# Patient Record
Sex: MALE | Race: WHITE | Employment: STUDENT | URBAN - METROPOLITAN AREA
[De-identification: names, ages, dates, MRNs, and addresses within clinical notes are randomized per-mention and may not be internally consistent; named-entity substitution may affect disease eponyms.]

---
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
## 2023-12-30 PROCEDURE — 99285 EMERGENCY DEPT VISIT HI MDM: CPT

## 2023-12-31 ENCOUNTER — APPOINTMENT (OUTPATIENT)
Dept: GENERAL RADIOLOGY | Age: 18
End: 2023-12-31
Payer: COMMERCIAL

## 2023-12-31 ENCOUNTER — HOSPITAL ENCOUNTER (INPATIENT)
Age: 18
LOS: 6 days | Discharge: HOME | End: 2024-01-06
Attending: STUDENT IN AN ORGANIZED HEALTH CARE EDUCATION/TRAINING PROGRAM | Admitting: PSYCHIATRY & NEUROLOGY
Payer: COMMERCIAL

## 2023-12-31 DIAGNOSIS — F29 PSYCHOSIS, UNSPECIFIED PSYCHOSIS TYPE (HCC): Primary | ICD-10-CM

## 2023-12-31 PROBLEM — F32.A DEPRESSION WITH SUICIDAL IDEATION: Status: ACTIVE | Noted: 2023-12-31

## 2023-12-31 PROBLEM — R45.851 DEPRESSION WITH SUICIDAL IDEATION: Status: ACTIVE | Noted: 2023-12-31

## 2023-12-31 LAB
ALBUMIN SERPL-MCNC: 4.7 G/DL (ref 3.5–5.2)
ALP SERPL-CCNC: 80 U/L (ref 40–129)
ALT SERPL-CCNC: 19 U/L (ref 5–41)
AMPHET UR QL SCN: NEGATIVE
ANION GAP SERPL CALCULATED.3IONS-SCNC: 13 MMOL/L (ref 9–17)
AST SERPL-CCNC: 17 U/L
BARBITURATES UR QL SCN: NEGATIVE
BASOPHILS # BLD: 0.1 K/UL (ref 0–0.2)
BASOPHILS NFR BLD: 1 % (ref 0–2)
BENZODIAZ UR QL: NEGATIVE
BILIRUB SERPL-MCNC: 0.4 MG/DL (ref 0.3–1.2)
BILIRUB UR QL STRIP: NEGATIVE
BUN SERPL-MCNC: 21 MG/DL (ref 6–20)
CALCIUM SERPL-MCNC: 9.1 MG/DL (ref 8.6–10.4)
CANNABINOIDS UR QL SCN: POSITIVE
CHLORIDE SERPL-SCNC: 102 MMOL/L (ref 98–107)
CLARITY UR: CLEAR
CO2 SERPL-SCNC: 24 MMOL/L (ref 20–31)
COCAINE UR QL SCN: NEGATIVE
COLOR UR: YELLOW
COMMENT: ABNORMAL
CREAT SERPL-MCNC: 0.8 MG/DL (ref 0.7–1.2)
EOSINOPHIL # BLD: 0.2 K/UL (ref 0–0.4)
EOSINOPHILS RELATIVE PERCENT: 3 % (ref 0–4)
ERYTHROCYTE [DISTWIDTH] IN BLOOD BY AUTOMATED COUNT: 12.9 % (ref 11.5–14.9)
ETHANOL PERCENT: <0.01 %
ETHANOLAMINE SERPL-MCNC: <10 MG/DL
FENTANYL UR QL: NEGATIVE
GFR SERPL CREATININE-BSD FRML MDRD: >60 ML/MIN/1.73M2
GLUCOSE SERPL-MCNC: 106 MG/DL (ref 70–99)
GLUCOSE UR STRIP-MCNC: NEGATIVE MG/DL
HCT VFR BLD AUTO: 49.2 % (ref 41–53)
HGB BLD-MCNC: 16.5 G/DL (ref 13.5–17.5)
HGB UR QL STRIP.AUTO: NEGATIVE
KETONES UR STRIP-MCNC: ABNORMAL MG/DL
LEUKOCYTE ESTERASE UR QL STRIP: NEGATIVE
LYMPHOCYTES NFR BLD: 2 K/UL (ref 1.2–5.2)
LYMPHOCYTES RELATIVE PERCENT: 23 % (ref 25–45)
MAGNESIUM SERPL-MCNC: 2 MG/DL (ref 1.6–2.6)
MCH RBC QN AUTO: 30 PG (ref 26–34)
MCHC RBC AUTO-ENTMCNC: 33.6 G/DL (ref 31–37)
MCV RBC AUTO: 89.3 FL (ref 80–100)
METHADONE UR QL: NEGATIVE
MONOCYTES NFR BLD: 0.6 K/UL (ref 0.1–1.3)
MONOCYTES NFR BLD: 7 % (ref 2–8)
NEUTROPHILS NFR BLD: 66 % (ref 34–64)
NEUTS SEG NFR BLD: 5.8 K/UL (ref 1.3–9.1)
NITRITE UR QL STRIP: NEGATIVE
OPIATES UR QL SCN: NEGATIVE
OXYCODONE UR QL SCN: NEGATIVE
PCP UR QL SCN: NEGATIVE
PH UR STRIP: 7 [PH] (ref 5–8)
PLATELET # BLD AUTO: 146 K/UL (ref 150–450)
PMV BLD AUTO: 8.4 FL (ref 6–12)
POTASSIUM SERPL-SCNC: 3.7 MMOL/L (ref 3.7–5.3)
PROT SERPL-MCNC: 6.7 G/DL (ref 6.4–8.3)
PROT UR STRIP-MCNC: NEGATIVE MG/DL
RBC # BLD AUTO: 5.51 M/UL (ref 4.5–5.9)
SODIUM SERPL-SCNC: 139 MMOL/L (ref 135–144)
SP GR UR STRIP: 1.03 (ref 1–1.03)
TEST INFORMATION: ABNORMAL
TROPONIN I SERPL HS-MCNC: 7 NG/L (ref 0–22)
UROBILINOGEN UR STRIP-ACNC: NORMAL EU/DL (ref 0–1)
WBC OTHER # BLD: 8.8 K/UL (ref 4.5–13.5)

## 2023-12-31 PROCEDURE — 81003 URINALYSIS AUTO W/O SCOPE: CPT

## 2023-12-31 PROCEDURE — 99223 1ST HOSP IP/OBS HIGH 75: CPT | Performed by: NURSE PRACTITIONER

## 2023-12-31 PROCEDURE — 85025 COMPLETE CBC W/AUTO DIFF WBC: CPT

## 2023-12-31 PROCEDURE — 6370000000 HC RX 637 (ALT 250 FOR IP): Performed by: PSYCHIATRY & NEUROLOGY

## 2023-12-31 PROCEDURE — 84443 ASSAY THYROID STIM HORMONE: CPT

## 2023-12-31 PROCEDURE — 83735 ASSAY OF MAGNESIUM: CPT

## 2023-12-31 PROCEDURE — 80053 COMPREHEN METABOLIC PANEL: CPT

## 2023-12-31 PROCEDURE — 36415 COLL VENOUS BLD VENIPUNCTURE: CPT

## 2023-12-31 PROCEDURE — 84484 ASSAY OF TROPONIN QUANT: CPT

## 2023-12-31 PROCEDURE — 93005 ELECTROCARDIOGRAM TRACING: CPT | Performed by: STUDENT IN AN ORGANIZED HEALTH CARE EDUCATION/TRAINING PROGRAM

## 2023-12-31 PROCEDURE — 93005 ELECTROCARDIOGRAM TRACING: CPT

## 2023-12-31 PROCEDURE — G0480 DRUG TEST DEF 1-7 CLASSES: HCPCS

## 2023-12-31 PROCEDURE — 71045 X-RAY EXAM CHEST 1 VIEW: CPT

## 2023-12-31 PROCEDURE — 2040000000 HC PSYCH ICU R&B

## 2023-12-31 PROCEDURE — 6370000000 HC RX 637 (ALT 250 FOR IP): Performed by: NURSE PRACTITIONER

## 2023-12-31 PROCEDURE — 80307 DRUG TEST PRSMV CHEM ANLYZR: CPT

## 2023-12-31 RX ORDER — HALOPERIDOL 5 MG/1
5 TABLET ORAL EVERY 4 HOURS PRN
Status: DISCONTINUED | OUTPATIENT
Start: 2023-12-31 | End: 2024-01-06 | Stop reason: HOSPADM

## 2023-12-31 RX ORDER — HYDROXYZINE 50 MG/1
50 TABLET, FILM COATED ORAL 3 TIMES DAILY PRN
Status: DISCONTINUED | OUTPATIENT
Start: 2023-12-31 | End: 2024-01-06 | Stop reason: HOSPADM

## 2023-12-31 RX ORDER — TRAZODONE HYDROCHLORIDE 50 MG/1
50 TABLET ORAL NIGHTLY PRN
Status: DISCONTINUED | OUTPATIENT
Start: 2023-12-31 | End: 2024-01-06 | Stop reason: HOSPADM

## 2023-12-31 RX ORDER — POLYETHYLENE GLYCOL 3350 17 G/17G
17 POWDER, FOR SOLUTION ORAL DAILY PRN
Status: DISCONTINUED | OUTPATIENT
Start: 2023-12-31 | End: 2024-01-06 | Stop reason: HOSPADM

## 2023-12-31 RX ORDER — IBUPROFEN 400 MG/1
400 TABLET ORAL EVERY 6 HOURS PRN
Status: DISCONTINUED | OUTPATIENT
Start: 2023-12-31 | End: 2024-01-06 | Stop reason: HOSPADM

## 2023-12-31 RX ORDER — ACETAMINOPHEN 325 MG/1
650 TABLET ORAL EVERY 4 HOURS PRN
Status: DISCONTINUED | OUTPATIENT
Start: 2023-12-31 | End: 2024-01-06 | Stop reason: HOSPADM

## 2023-12-31 RX ORDER — MAGNESIUM HYDROXIDE/ALUMINUM HYDROXICE/SIMETHICONE 120; 1200; 1200 MG/30ML; MG/30ML; MG/30ML
30 SUSPENSION ORAL EVERY 6 HOURS PRN
Status: DISCONTINUED | OUTPATIENT
Start: 2023-12-31 | End: 2024-01-06 | Stop reason: HOSPADM

## 2023-12-31 RX ORDER — LORAZEPAM 2 MG/ML
2 INJECTION INTRAMUSCULAR EVERY 4 HOURS PRN
Status: DISCONTINUED | OUTPATIENT
Start: 2023-12-31 | End: 2024-01-06 | Stop reason: HOSPADM

## 2023-12-31 RX ORDER — LORAZEPAM 1 MG/1
2 TABLET ORAL EVERY 4 HOURS PRN
Status: DISCONTINUED | OUTPATIENT
Start: 2023-12-31 | End: 2024-01-06 | Stop reason: HOSPADM

## 2023-12-31 RX ORDER — RISPERIDONE 1 MG/1
1 TABLET ORAL 2 TIMES DAILY
Status: DISCONTINUED | OUTPATIENT
Start: 2023-12-31 | End: 2024-01-01

## 2023-12-31 RX ORDER — DIVALPROEX SODIUM 500 MG/1
500 TABLET, EXTENDED RELEASE ORAL
Status: DISCONTINUED | OUTPATIENT
Start: 2023-12-31 | End: 2024-01-06 | Stop reason: HOSPADM

## 2023-12-31 RX ORDER — HALOPERIDOL 5 MG/ML
5 INJECTION INTRAMUSCULAR EVERY 4 HOURS PRN
Status: DISCONTINUED | OUTPATIENT
Start: 2023-12-31 | End: 2024-01-06 | Stop reason: HOSPADM

## 2023-12-31 RX ORDER — POLYETHYLENE GLYCOL 3350 17 G
2 POWDER IN PACKET (EA) ORAL
Status: DISCONTINUED | OUTPATIENT
Start: 2023-12-31 | End: 2024-01-06 | Stop reason: HOSPADM

## 2023-12-31 RX ORDER — DIPHENHYDRAMINE HYDROCHLORIDE 50 MG/ML
50 INJECTION INTRAMUSCULAR; INTRAVENOUS EVERY 4 HOURS PRN
Status: DISCONTINUED | OUTPATIENT
Start: 2023-12-31 | End: 2024-01-06 | Stop reason: HOSPADM

## 2023-12-31 RX ADMIN — LORAZEPAM 2 MG: 1 TABLET ORAL at 09:18

## 2023-12-31 RX ADMIN — HYDROXYZINE HYDROCHLORIDE 50 MG: 50 TABLET, FILM COATED ORAL at 20:41

## 2023-12-31 RX ADMIN — TRAZODONE HYDROCHLORIDE 50 MG: 50 TABLET ORAL at 20:41

## 2023-12-31 RX ADMIN — HALOPERIDOL 5 MG: 5 TABLET ORAL at 09:17

## 2023-12-31 RX ADMIN — NICOTINE POLACRILEX 2 MG: 2 LOZENGE ORAL at 09:01

## 2023-12-31 RX ADMIN — HYDROXYZINE HYDROCHLORIDE 50 MG: 50 TABLET, FILM COATED ORAL at 02:29

## 2023-12-31 RX ADMIN — RISPERIDONE 1 MG: 1 TABLET ORAL at 16:04

## 2023-12-31 RX ADMIN — RISPERIDONE 1 MG: 1 TABLET ORAL at 20:41

## 2023-12-31 RX ADMIN — HYDROXYZINE HYDROCHLORIDE 50 MG: 50 TABLET, FILM COATED ORAL at 09:01

## 2023-12-31 RX ADMIN — DIVALPROEX SODIUM 500 MG: 500 TABLET, EXTENDED RELEASE ORAL at 16:04

## 2023-12-31 RX ADMIN — NICOTINE POLACRILEX 2 MG: 2 LOZENGE ORAL at 16:14

## 2023-12-31 RX ADMIN — TRAZODONE HYDROCHLORIDE 50 MG: 50 TABLET ORAL at 02:29

## 2023-12-31 ASSESSMENT — SLEEP AND FATIGUE QUESTIONNAIRES
AVERAGE NUMBER OF SLEEP HOURS: 3
DO YOU USE A SLEEP AID: NO
SLEEP PATTERN: DIFFICULTY FALLING ASLEEP;DISTURBED/INTERRUPTED SLEEP;RESTLESSNESS
DO YOU HAVE DIFFICULTY SLEEPING: YES

## 2023-12-31 ASSESSMENT — PATIENT HEALTH QUESTIONNAIRE - PHQ9
8. MOVING OR SPEAKING SO SLOWLY THAT OTHER PEOPLE COULD HAVE NOTICED. OR THE OPPOSITE, BEING SO FIGETY OR RESTLESS THAT YOU HAVE BEEN MOVING AROUND A LOT MORE THAN USUAL: 0
10. IF YOU CHECKED OFF ANY PROBLEMS, HOW DIFFICULT HAVE THESE PROBLEMS MADE IT FOR YOU TO DO YOUR WORK, TAKE CARE OF THINGS AT HOME, OR GET ALONG WITH OTHER PEOPLE: 2
5. POOR APPETITE OR OVEREATING: 1
SUM OF ALL RESPONSES TO PHQ QUESTIONS 1-9: 11
SUM OF ALL RESPONSES TO PHQ QUESTIONS 1-9: 13
9. THOUGHTS THAT YOU WOULD BE BETTER OFF DEAD, OR OF HURTING YOURSELF: 2
SUM OF ALL RESPONSES TO PHQ QUESTIONS 1-9: 13
3. TROUBLE FALLING OR STAYING ASLEEP: 2
SUM OF ALL RESPONSES TO PHQ9 QUESTIONS 1 & 2: 4
6. FEELING BAD ABOUT YOURSELF - OR THAT YOU ARE A FAILURE OR HAVE LET YOURSELF OR YOUR FAMILY DOWN: 2
SUM OF ALL RESPONSES TO PHQ QUESTIONS 1-9: 13
1. LITTLE INTEREST OR PLEASURE IN DOING THINGS: 2
7. TROUBLE CONCENTRATING ON THINGS, SUCH AS READING THE NEWSPAPER OR WATCHING TELEVISION: 0
4. FEELING TIRED OR HAVING LITTLE ENERGY: 2
2. FEELING DOWN, DEPRESSED OR HOPELESS: 2

## 2023-12-31 ASSESSMENT — PAIN - FUNCTIONAL ASSESSMENT: PAIN_FUNCTIONAL_ASSESSMENT: 0-10

## 2023-12-31 ASSESSMENT — PAIN SCALES - GENERAL
PAINLEVEL_OUTOF10: 0
PAINLEVEL_OUTOF10: 0

## 2023-12-31 ASSESSMENT — LIFESTYLE VARIABLES
HOW OFTEN DO YOU HAVE A DRINK CONTAINING ALCOHOL: MONTHLY OR LESS
HOW MANY STANDARD DRINKS CONTAINING ALCOHOL DO YOU HAVE ON A TYPICAL DAY: 1 OR 2
HOW OFTEN DO YOU HAVE A DRINK CONTAINING ALCOHOL: MONTHLY OR LESS
HOW MANY STANDARD DRINKS CONTAINING ALCOHOL DO YOU HAVE ON A TYPICAL DAY: 1 OR 2

## 2023-12-31 NOTE — PROGRESS NOTES
Pt transferred to PICU X 2 staff. Report given to staff and valuables placed in belongings room. Pt behavior controlled while transferred.

## 2023-12-31 NOTE — ED NOTES
Mode of arrival (squad #, walk in, police, etc) : Walk-in        Chief complaint(s): Anxiety, generalized body aches, tachycardia, hallucinations, chest pain        Arrival Note (brief scenario, treatment PTA, etc).: Pt states that his parents \"forced him to come to the ED\" but asking further pt states he wants to be seen. Pt states that he has anxiety, \"heart beating fast\", shaking, chest pain, and generalized body aches. Pt states that he has \"deep vibes\" that makes him want to get away. Pt states that he denies SI/HI but is hearing voices that tells him to \"get away\". Pt states that his parents and girlfriends are hiding his meds and not sharing information. Pt denies drugs or alcohol.         C= \"Have you ever felt that you should Cut down on your drinking?\"  No  A= \"Have people Annoyed you by criticizing your drinking?\"  No  G= \"Have you ever felt bad or Guilty about your drinking?\"  No  E= \"Have you ever had a drink as an Eye-opener first thing in the morning to steady your nerves or to help a hangover?\"  No      Deferred []      Reason for deferring: N/A    *If yes to two or more: probable alcohol abuse.*

## 2023-12-31 NOTE — PROGRESS NOTES
Behavioral Services  Medicare Certification Upon Admission    I certify that this patient's inpatient psychiatric hospital admission is medically necessary for:    [x] (1) Treatment which could reasonably be expected to improve this patient's condition,       [x] (2) Or for diagnostic study;     AND     [x](2) The inpatient psychiatric services are provided while the individual is under the care of a physician and are included in the individualized plan of care.    Estimated length of stay/service 4 to 7 days    Plan for post-hospital care home with outpatient community mental health follow-up    Electronically signed by CARMEL COBIAN MD on 12/31/2023 at 10:09 AM
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
## 2023-12-31 NOTE — PROGRESS NOTES
RT ASSESSMENT TREATMENT GOALS    []Pt Goal: Pt will identify 1-2 positive coping skills by time of discharge.    []Pt Goal: Pt will identify 1-2 positive aspects of self by time of discharge.    [x]Pt Goal: Pt will remain on task/topic for 15-30 minutes during group by time of discharge.    []Pt Goal: Pt will identify 1-2 aspects of relapse prevention plan by time of discharge.    [x]Pt Goal: Pt will join in conversation with peers 1-2 times per group by time of discharge.    []Pt Goal: Pt will identify 1-2 new leisure interests by time of discharge.    [x]Pt Goal: Pt will not voice any delusional content by time of discharge.

## 2023-12-31 NOTE — PLAN OF CARE
Problem: Depression/Self Harm  Goal: Effect of psychiatric condition will be minimized and patient will be protected from self harm  Description: INTERVENTIONS:  1. Assess impact of patient's symptoms on level of functioning, self care needs and offer support as indicated  2. Assess patient/family knowledge of depression, impact on illness and need for teaching  3. Provide emotional support, presence and reassurance  4. Assess for possible suicidal thoughts or ideation. If patient expresses suicidal thoughts or statements do not leave alone, initiate Suicide Precautions, move to a room close to the nursing station and obtain sitter  5. Initiate consults as appropriate with Mental Health Professional, Spiritual Care, Psychosocial CNS, and consider a recommendation to the LIP for a Psychiatric Consultation  Outcome: Progressing   1:1 with pt x ten minutes.  Pt encouraged to attend unit programming and interact with peers and staff.  Pt also encouraged to tend to hygiene and ADLs.  Pt encouraged to discuss feelings with staff and feedback and reassurance provided.    Pt is agitated as evidence by pacing, fidgeting, and rambling speech. Staff attempted to find and relieve the distress by  talking to pt, refocusing on new activity, offering suggestions, checking for undiagnosed pain, administer PRN medications. Pt is currently in behavioral control and accepted PRN medications. Pt did sleep after prn medications. Pt is paranoid et delusional that family is out to kill him.

## 2023-12-31 NOTE — CARE COORDINATION
BHI Biopsychosocial Assessment    Current Level of Psychosocial Functioning     Independent   Dependent  X  Minimal Assist       Psychosocial High Risk Factors (check all that apply)    Unable to obtain meds   Chronic illness/pain    Substance abuse X Marijuana   Lack of Family Support   Financial stress   Isolation X  Inadequate Community Resources   Suicide attempt(s)   Not taking medications   Victim of crime   Developmental Delay  Unable to manage personal needs  X  Age 65 or older   Homeless  No transportation   Readmission within 30 days X  Unemployment  Traumatic Event    Psychiatric Advanced Directives: none reported     Family to Involve in Treatment: Reports mother and father are supportive and involved in his care     Sexual Orientation: LAUREL    Patient Strengths: family support, linked with [facility] for outpatient Treatment, insurance     Patient Barriers: off medications, presenting with hallucinations and Acute Psychosis, Recent inpatient hospitalization at [facility] from 12/16/12/22/2023.     Opiate Education Provided: N/A Patient denies and does not have any documented history of Opiate or Heroin use/abuse. Patient reports Marijuana use and his drug screen upon admission was positive for Cannabis.     CMHC/mental health history: linked with [facility], was staying woth parents in [place], now stating that he no longer wants to live with them and wants to go to live with his Uncle in [place], [facility] hospitalization was from 12/16-12/22/2023.     Plan of Care   medication management, group/individual therapies, family meetings, psycho -education, treatment team meetings to assist with stabilization    Initial Discharge Plan:  Patient reports that he wants to stay with his uncle in [place] at discharge, plans are undetermined at this time. Patient will be scheduled an outpatient follow up appointment at discharge.     Clinical Summary:  Car Coyle is a 18 year old male who presents on admission with

## 2023-12-31 NOTE — ED PROVIDER NOTES
EMERGENCY DEPARTMENT ENCOUNTER    Pt Name: Car Coyle  MRN: [id number]  Birthdate 2005  Date of evaluation: 12/31/23  CHIEF COMPLAINT       Chief Complaint   Patient presents with    Anxiety    Generalized Body Aches    Tachycardia    Hallucinations    Chest Pain     HISTORY OF PRESENT ILLNESS   18-year-old presenting with generalized complaints    Has a history of previous psychiatric admitted for psychosis recently    States he is not feeling well he is feeling anxious he is feeling shaking all over his body he is feeling palpitations and chest discomfort he feels nervous    He denies any suicidal homicidal ideation    States he has not been taking his medication              REVIEW OF SYSTEMS     Review of Systems   Constitutional:  Negative for chills and fever.   HENT:  Negative for rhinorrhea and sore throat.    Eyes:  Negative for discharge and redness.   Respiratory:  Negative for chest tightness and shortness of breath.    Cardiovascular:  Positive for chest pain and palpitations.   Gastrointestinal:  Negative for abdominal pain, diarrhea, nausea and vomiting.   Genitourinary:  Negative for dysuria and frequency.   Musculoskeletal:  Positive for myalgias. Negative for arthralgias.   Skin:  Negative for rash.   Neurological:  Negative for weakness and numbness.   Psychiatric/Behavioral:  Negative for suicidal ideas. The patient is nervous/anxious.    All other systems reviewed and are negative.    PASTMEDICAL HISTORY   History reviewed. No pertinent past medical history.  Past Problem List  Patient Active Problem List   Diagnosis Code    Acute psychosis ([facility]) F23     SURGICAL HISTORY       Past Surgical History:   Procedure Laterality Date    ADENOIDECTOMY      TONSILLECTOMY       CURRENT MEDICATIONS       Previous Medications    DIVALPROEX (DEPAKOTE ER) 500 MG EXTENDED RELEASE TABLET    Take 1 tablet by mouth 2 times daily (before meals)    HYDROXYZINE HCL (ATARAX) 50 MG TABLET    Take 1 tablet  FOLLOW UP THE PATIENT:  No follow-up provider specified.    MD Eddie Long Jeffrey, MD  12/31/23 0144

## 2023-12-31 NOTE — PROGRESS NOTES
Pt is agitated as evidence by pacing, fidgeting, and rambling speech. Staff attempted to find and relieve the distress by  talking to pt, refocusing on new activity, offering suggestions, checking for undiagnosed pain, administer PRN medications. Pt is currently in behavioral control and accepted PRN medications. Continues to pace unit.

## 2023-12-31 NOTE — GROUP NOTE
Community Meeting Group Note        Date: December 31, 2023 Start Time: 9am  End Time: 10am      Number of Participants in Group & Unit Census:  11/20    Topic: Goal group    Goal of Group:Coping skills      Comments:     Patient did not participate in Community Meeting group, despite staff encouragement and explanation of benefits.  Patient remain seclusive to self.  Q15 minute safety checks maintained for patient safety and will continue to encourage patient to attend unit programming.

## 2023-12-31 NOTE — PLAN OF CARE
[facility]  Initial Interdisciplinary Treatment Plan NO      Original treatment plan Date & Time: 12/31/2023   1300    Admission Type:  Admission Type: Voluntary    Reason for admission:   Reason for Admission: Patient acting erratic at home, impulsive, destroying his jie gifts, not sleeping. Patient hasn't been taking his medications regulary since being discharged last.    Estimated Length of Stay:  5-7days  Estimated Discharge Date: to be determined by physician    PATIENT STRENGTHS:  Patient Strengths:   Patient Strengths and Limitations:Limitations: Unrealistic self-view, Difficulty problem solving/relies on others to help solve problems, Difficult relationships / poor social skills  Addictive Behavior: Addictive Behavior  In the Past 3 Months, Have You Felt or Has Someone Told You That You Have a Problem With  : None  Medical Problems:History reviewed. No pertinent past medical history.  Status EXAM:Mental Status and Behavioral Exam  Normal: No  Level of Assistance: Independent/Self  Facial Expression: Exaggerated, Hostile  Affect: Unstable, Incongruent, Inappropriate  Level of Consciousness: Alert  Frequency of Checks: 4 times per hour, close  Mood:Normal: No  Mood: Anxious, Labile, Suspicious, Irritable  Motor Activity:Normal: No  Motor Activity: Agitated  Eye Contact: Poor  Observed Behavior: Aggressive, Compulsive, Agitated, Hostile, Impulsive, Preoccupied  Sexual Misconduct History: Current - no  Preception: [place] to person, [place] to time, [place] to place, [place] to situation  Attention:Normal: No  Attention: Distractible  Thought Processes: Flight of ideas, Loose association, Tangential  Thought Content:Normal: No  Thought Content: Paranoia, Preoccupations  Depression Symptoms: Impaired concentration  Anxiety Symptoms: Generalized  Jacquie Symptoms: Flight of ideas, Labile, Pressured speech, Rapid cycling  Hallucinations: None  Delusions: Yes  Delusions: Paranoid  Memory:Normal:

## 2023-12-31 NOTE — GROUP NOTE
Group Therapy Note    Date: 12/31/2023    Group Start Time: 1330  Group End Time: 1415  Group Topic: Cognitive Skills    Kimberly Costa CTRS        Group Therapy Note    Attendees: 8/19     Topic: To increase social interaction, decision making, and communication skills.        Patient did not participate in Cognitive Skills Group at 13:30, despite staff encouragement and explanation of   benefits. Patient remains somnolent, seclusive to self and room.       Q15 minute safety checks maintained for patient safety and will continue to encourage patient to attend unit   programming.         Discipline Responsible: Psychoeducational Specialist    Signature:  THU AGRAWAL
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
## 2023-12-31 NOTE — BH NOTE
[facility] [place]  Admission Note     Admission Type:   Admission Type: Voluntary    Reason for admission:  Patient acting erratic at home, impulsive, destroying his jie gifts, not sleeping. Patient hasn't been taking his medications regulary since being discharged last.     Addictive Behavior:   Addictive Behavior  In the Past 3 Months, Have You Felt or Has Someone Told You That You Have a Problem With  : None    Medical Problems:   History reviewed. No pertinent past medical history.    Status EXAM:  Mental Status and Behavioral Exam  Normal: No  Level of Assistance: Independent/Self  Facial Expression: Flat, Worried  Affect: Constricted  Level of Consciousness: Alert  Frequency of Checks: 4 times per hour, close  Mood:Normal: No  Mood: Anxious  Motor Activity:Normal: No  Motor Activity: Other (comment) (pt shaking)  Eye Contact: Poor  Observed Behavior: Cooperative, Preoccupied  Sexual Misconduct History: Current - no  Preception: [place] to person, [place] to time, [place] to place, [place] to situation  Attention:Normal: No  Attention: Unable to concentrate  Thought Processes: Unremarkable  Thought Content:Normal: No  Thought Content: Preoccupations, Paranoia  Depression Symptoms: Sleep disturbance  Anxiety Symptoms: Chest pain, Generalized  Jacquie Symptoms: Less need to sleep, Poor judgment  Hallucinations: None  Delusions: Yes  Delusions: Paranoid  Memory:Normal: No  Memory: Poor recent  Insight and Judgment: No  Insight and Judgment: Poor judgment, Poor insight    Tobacco Screening:  Practical Counseling, on admission, dinora X, if applicable and completed (first 3 are required if patient doesn't refuse):            ( ) Recognizing danger situations (included triggers and roadblocks)                    ( ) Coping skills (new ways to manage stress,relaxation techniques, changing routine, distraction)                                                           ( ) Basic information about quitting  (benefits of quitting, techniques in how to quit, available resources  ( ) [facility]                                                                                                                   ( x) Patient refused counseling  ( ) Patient has not smoked in the last 30 days    Metabolic Screening:    Lab Results   Component Value Date    LABA1C 4.7 12/19/2023       Lab Results   Component Value Date    CHOL 114 12/19/2023     Lab Results   Component Value Date    TRIG 55 12/19/2023     Lab Results   Component Value Date    HDL 61 12/19/2023     No components found for: \"LDLCAL\"  No results found for: \"LABVLDL\"      Body mass index is 22.66 kg/m².    BP Readings from Last 2 Encounters:   12/31/23 129/62   12/22/23 137/86         Pt admitted with followings belongings:  Dental Appliances: Other (Comment) (PATIENT BELONGINGS DOUBLE BAGGED DUE TO POSSIBLE BED BUGS)  Vision - Corrective Lenses: Other (Comment)  Hearing Aid: Other (Comment)  Jewelry: Other (Comment)  Body Piercings Removed: N/A  Clothing: Other (Comment)  Other Valuables: Other (Comment)  The following personal items were collected during admission. Items secured in locker/safe. Items will be returned to patient at discharge.       Patient wanded for safety and oriented to unit.   Cristela Sotelo RN

## 2023-12-31 NOTE — BH NOTE
Patient given tobacco quitline number 15425181807 at this time, refusing to call at this time, states \" I just dont want to quit now\"- patient given information as to the dangers of long term tobacco use. Continue to reinforce the importance of tobacco cessation.

## 2023-12-31 NOTE — H&P
IN-PATIENT SERVICE  [facility]    CONSULTATION / HISTORY AND PHYSICAL EXAMINATION            Date:   12/31/2023  Patient name:  Car Coyle  Date of admission:  12/31/2023 12:24 AM  MRN:   [id number]  Account:  [id number]  YOB: 2005  PCP:    Uvaldo Shaw MD  Room:   [address] [place] [address]  Code Status:    Full Code    Physician Requesting Consult: Luis A Gonzalez MD    Reason for Consult:  medical management    Chief Complaint:     Chief Complaint   Patient presents with    Anxiety    Generalized Body Aches    Tachycardia    Hallucinations    Chest Pain       History Obtained From:     Patient medical record nursing staff    History of Present Illness:     Patient is 18-year-old male with past medical history of mental illness, he has been admitted at [facility] for further management of acute psychosis  Patient had similar  admissions, found to be hypertensive which was likely secondary to her agitation, not on any medications, blood pressures currently stay    Past Medical History:     History reviewed. No pertinent past medical history.     Past Surgical History:     Past Surgical History:   Procedure Laterality Date    ADENOIDECTOMY      TONSILLECTOMY          Medications Prior to Admission:     Prior to Admission medications    Medication Sig Start Date End Date Taking? Authorizing Provider   divalproex (DEPAKOTE ER) 500 MG extended release tablet Take 1 tablet by mouth 2 times daily (before meals) 12/22/23   Liam Hannon MD   traZODone (DESYREL) 50 MG tablet Take 1 tablet by mouth nightly as needed for Sleep 12/22/23   Liam Hannon MD   risperiDONE (RISPERDAL) 1 MG tablet Take 1 tablet by mouth 2 times daily 12/22/23   Liam Hannon MD   hydrOXYzine HCl (ATARAX) 50 MG tablet Take 1 tablet by mouth 3 times daily as needed for Anxiety    ProviderRubin MD        Allergies:     Patient has no known allergies.    Social  extraocular eye movements intact, conjunctiva clear  Ear: normal external ear, no discharge, hearing intact  Nose:  no drainage noted  Mouth: mucous membranes moist  Neck: supple, no carotid bruits, thyroid not palpable  Lungs: Bilateral equal air entry, clear to ausculation, no wheezing, rales or rhonchi, normal effort  Cardiovascular: normal rate, regular rhythm, no murmur, gallop, rub.  Abdomen: Soft, nontender, nondistended, normal bowel sounds, no hepatomegaly or splenomegaly  Neurologic: There are no new focal motor or sensory deficits, normal muscle tone and bulk, no abnormal sensation, normal speech, cranial nerves II through XII grossly intact  Skin: No gross lesions, rashes, bruising or bleeding on exposed skin area  Extremities:  peripheral pulses palpable, no pedal edema or calf pain with palpation  Psych:     Investigations:      Laboratory Testing:  Recent Results (from the past 24 hour(s))   Urinalysis with Reflex to Culture    Collection Time: 12/31/23  1:00 AM    Specimen: Urine   Result Value Ref Range    Color, UA Yellow Yellow    Turbidity UA Clear Clear    Glucose, Ur NEGATIVE NEGATIVE mg/dL    Bilirubin Urine NEGATIVE NEGATIVE    Ketones, Urine SMALL (A) NEGATIVE mg/dL    Specific Gravity, UA 1.026 1.000 - 1.030    Urine Hgb NEGATIVE NEGATIVE    pH, UA 7.0 5.0 - 8.0    Protein, UA NEGATIVE NEGATIVE mg/dL    Urobilinogen, Urine Normal 0.0 - 1.0 EU/dL    Nitrite, Urine NEGATIVE NEGATIVE    Leukocyte Esterase, Urine NEGATIVE NEGATIVE    Comment       Microscopic exam not performed based on chemical results unless requested in original order.   Urine Drug Screen    Collection Time: 12/31/23  1:00 AM   Result Value Ref Range    Amphetamine Screen, Ur NEGATIVE NEGATIVE    Barbiturate Screen, Ur NEGATIVE NEGATIVE    Benzodiazepine Screen, Urine NEGATIVE NEGATIVE    Cocaine Metabolite, Urine NEGATIVE NEGATIVE    Methadone Screen, Urine NEGATIVE NEGATIVE    Opiates, Urine NEGATIVE NEGATIVE

## 2024-01-01 LAB — TSH SERPL DL<=0.05 MIU/L-ACNC: 1.97 UIU/ML (ref 0.3–5)

## 2024-01-01 PROCEDURE — 6370000000 HC RX 637 (ALT 250 FOR IP): Performed by: PSYCHIATRY & NEUROLOGY

## 2024-01-01 PROCEDURE — 6370000000 HC RX 637 (ALT 250 FOR IP): Performed by: NURSE PRACTITIONER

## 2024-01-01 PROCEDURE — APPSS30 APP SPLIT SHARED TIME 16-30 MINUTES

## 2024-01-01 PROCEDURE — 2040000000 HC PSYCH ICU R&B

## 2024-01-01 PROCEDURE — 99232 SBSQ HOSP IP/OBS MODERATE 35: CPT | Performed by: PSYCHIATRY & NEUROLOGY

## 2024-01-01 RX ORDER — RISPERIDONE 3 MG/1
1.5 TABLET ORAL 2 TIMES DAILY
Status: DISCONTINUED | OUTPATIENT
Start: 2024-01-01 | End: 2024-01-03

## 2024-01-01 RX ADMIN — NICOTINE POLACRILEX 2 MG: 2 LOZENGE ORAL at 10:22

## 2024-01-01 RX ADMIN — HALOPERIDOL 5 MG: 5 TABLET ORAL at 17:14

## 2024-01-01 RX ADMIN — NICOTINE POLACRILEX 2 MG: 2 LOZENGE ORAL at 12:15

## 2024-01-01 RX ADMIN — NICOTINE POLACRILEX 2 MG: 2 LOZENGE ORAL at 14:47

## 2024-01-01 RX ADMIN — DIVALPROEX SODIUM 500 MG: 500 TABLET, EXTENDED RELEASE ORAL at 06:15

## 2024-01-01 RX ADMIN — LORAZEPAM 2 MG: 1 TABLET ORAL at 17:14

## 2024-01-01 RX ADMIN — HYDROXYZINE HYDROCHLORIDE 50 MG: 50 TABLET, FILM COATED ORAL at 16:06

## 2024-01-01 RX ADMIN — NICOTINE POLACRILEX 2 MG: 2 LOZENGE ORAL at 16:06

## 2024-01-01 RX ADMIN — DIVALPROEX SODIUM 500 MG: 500 TABLET, EXTENDED RELEASE ORAL at 16:06

## 2024-01-01 RX ADMIN — RISPERIDONE 1 MG: 1 TABLET ORAL at 07:46

## 2024-01-01 RX ADMIN — HYDROXYZINE HYDROCHLORIDE 50 MG: 50 TABLET, FILM COATED ORAL at 10:58

## 2024-01-01 RX ADMIN — NICOTINE POLACRILEX 2 MG: 2 LOZENGE ORAL at 07:38

## 2024-01-01 ASSESSMENT — PAIN SCALES - GENERAL: PAINLEVEL_OUTOF10: 0

## 2024-01-01 NOTE — BH NOTE
Emergency Medication Follow-Up Note:    PRN medication of PO Haldol 5mg Ativan 2mg was effective as evidence by Patient regain behavioral control. Patient denies medication side effects. Will continue to monitor and provide support as needed.

## 2024-01-01 NOTE — GROUP NOTE
Group Therapy Note    Date: 1/1/2024    Group Start Time: 1400  Group End Time: 1420  Group Topic: Cognitive Skills    [facility]    Josi Ruiz [facility]    Cognitive Skills Group Note        Date: January 1, 2024 Start Time: 2pm  End Time:  220pm      Number of Participants in Group & Unit Census:  2/7    Topic: cognitive skills     Goal of Group: pt will demonstrate improved coping skills and improved leisure awareness      Comments:     Patient did not participate in Cognitive Skills group, despite staff encouragement and explanation of benefits.  Patient remain seclusive to self.  Q15 minute safety checks maintained for patient safety and will continue to encourage patient to attend unit programming.              Signature:  THU MARINO

## 2024-01-01 NOTE — GROUP NOTE
Group Therapy Note    Date: 1/1/2024    Group Start Time: 0900  Group End Time: 0920  Group Topic: Community Meeting    Jessi Sellers        Group Therapy Note    Attendees: 5/7       Patient's Goal:  Talk to the doctor    Notes:  Goal setting    Status After Intervention:  Improved    Participation Level: Interactive    Participation Quality: Appropriate      Speech:  normal      Thought Process/Content: Logical      Affective Functioning: Congruent      Mood: anxious      Level of consciousness:  Alert      Response to Learning: Able to verbalize current knowledge/experience      Endings: None Reported    Modes of Intervention: Education, Support, Socialization, and Exploration      Discipline Responsible: Behavorial Health Tech      Signature:  Jessi Pandey

## 2024-01-01 NOTE — PLAN OF CARE
Problem: Pain  Goal: Verbalizes/displays adequate comfort level or baseline comfort level  Outcome: Progressing   Patient denies pain at this time.  Problem: Coping  Goal: Pt/Family able to verbalize concerns and demonstrate effective coping strategies  Description: INTERVENTIONS:  1. Assist patient/family to identify coping skills, available support systems and cultural and spiritual values  2. Provide emotional support, including active listening and acknowledgement of concerns of patient and caregivers  3. Reduce environmental stimuli, as able  4. Instruct patient/family in relaxation techniques, as appropriate  5. Assess for spiritual pain/suffering and initiate Spiritual Care, Psychosocial Clinical Specialist consults as needed  Outcome: Progressing   Patient flat, isolative, out for needs only. Patient accepting of medication administration.  Problem: Depression/Self Harm  Goal: Effect of psychiatric condition will be minimized and patient will be protected from self harm  Description: INTERVENTIONS:  1. Assess impact of patient's symptoms on level of functioning, self care needs and offer support as indicated  2. Assess patient/family knowledge of depression, impact on illness and need for teaching  3. Provide emotional support, presence and reassurance  4. Assess for possible suicidal thoughts or ideation. If patient expresses suicidal thoughts or statements do not leave alone, initiate Suicide Precautions, move to a room close to the nursing station and obtain sitter  5. Initiate consults as appropriate with Mental Health Professional, Spiritual Care, Psychosocial CNS, and consider a recommendation to the LIP for a Psychiatric Consultation  Outcome: Progressing   Patient reports minimal depression at this time. Patient encouraged to attend groups to work on coping skills.
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
## 2024-01-01 NOTE — BH NOTE
Father called and relayed information that per patient history, he does not respond well to Risperdal but rather does well on Depakote, Abilify and Hydroxyzine.

## 2024-01-01 NOTE — BH NOTE
Emergency PRN Medication Administration Note:      Patient is Agitated and Disruptive as evidence by Pacing yelling out bursts. Pt approached writer asking for something to calm down. Staff attempted to find and relieve the distress by Talking to patient, offering distractions  Patient is currently   accepted PRN medications. Medication Administered as prescribed: PO Haldol 5mg, Ativan 2mg. Patient Tolerated medication administration.   Will continue to monitor, offer support, and reassess.

## 2024-01-01 NOTE — PLAN OF CARE
Problem: Coping  Goal: Pt/Family able to verbalize concerns and demonstrate effective coping strategies  Description: INTERVENTIONS:  1. Assist patient/family to identify coping skills, available support systems and cultural and spiritual values  2. Provide emotional support, including active listening and acknowledgement of concerns of patient and caregivers  3. Reduce environmental stimuli, as able  4. Instruct patient/family in relaxation techniques, as appropriate  5. Assess for spiritual pain/suffering and initiate Spiritual Care, Psychosocial Clinical Specialist consults as needed  1/1/2024 1729 by Ana Rosa Garcia LPN  Outcome: Not Progressing  Flowsheets (Taken 1/1/2024 1729)  Patient/family able to verbalize anxieties, fears, and concerns, and demonstrate effective coping:   Assist patient/family to identify coping skills, available support systems and cultural and spiritual values   Provide emotional support, including active listening and acknowledgement of concerns of patient and caregivers   Reduce environmental stimuli, as able   Instruct patient/family in relaxation techniques, as appropriate     Problem: Depression/Self Harm  Goal: Effect of psychiatric condition will be minimized and patient will be protected from self harm  Description: INTERVENTIONS:  1. Assess impact of patient's symptoms on level of functioning, self care needs and offer support as indicated  2. Assess patient/family knowledge of depression, impact on illness and need for teaching  3. Provide emotional support, presence and reassurance  4. Assess for possible suicidal thoughts or ideation. If patient expresses suicidal thoughts or statements do not leave alone, initiate Suicide Precautions, move to a room close to the nursing station and obtain sitter  5. Initiate consults as appropriate with Mental Health Professional, Spiritual Care, Psychosocial CNS, and consider a recommendation to the LIP for a Psychiatric

## 2024-01-02 LAB
EKG ATRIAL RATE: 92 BPM
EKG P AXIS: 56 DEGREES
EKG P-R INTERVAL: 104 MS
EKG Q-T INTERVAL: 358 MS
EKG QRS DURATION: 100 MS
EKG QTC CALCULATION (BAZETT): 442 MS
EKG R AXIS: 86 DEGREES
EKG T AXIS: 36 DEGREES
EKG VENTRICULAR RATE: 92 BPM

## 2024-01-02 PROCEDURE — 6370000000 HC RX 637 (ALT 250 FOR IP): Performed by: PSYCHIATRY & NEUROLOGY

## 2024-01-02 PROCEDURE — 6370000000 HC RX 637 (ALT 250 FOR IP): Performed by: NURSE PRACTITIONER

## 2024-01-02 PROCEDURE — APPSS30 APP SPLIT SHARED TIME 16-30 MINUTES: Performed by: NURSE PRACTITIONER

## 2024-01-02 PROCEDURE — 99232 SBSQ HOSP IP/OBS MODERATE 35: CPT | Performed by: PSYCHIATRY & NEUROLOGY

## 2024-01-02 PROCEDURE — 6370000000 HC RX 637 (ALT 250 FOR IP)

## 2024-01-02 PROCEDURE — 2040000000 HC PSYCH ICU R&B

## 2024-01-02 RX ADMIN — TRAZODONE HYDROCHLORIDE 50 MG: 50 TABLET ORAL at 20:29

## 2024-01-02 RX ADMIN — NICOTINE POLACRILEX 2 MG: 2 LOZENGE ORAL at 15:19

## 2024-01-02 RX ADMIN — DIVALPROEX SODIUM 500 MG: 500 TABLET, EXTENDED RELEASE ORAL at 16:19

## 2024-01-02 RX ADMIN — HYDROXYZINE HYDROCHLORIDE 50 MG: 50 TABLET, FILM COATED ORAL at 20:29

## 2024-01-02 RX ADMIN — NICOTINE POLACRILEX 2 MG: 2 LOZENGE ORAL at 13:00

## 2024-01-02 RX ADMIN — RISPERIDONE 1.5 MG: 3 TABLET ORAL at 07:20

## 2024-01-02 RX ADMIN — DIVALPROEX SODIUM 500 MG: 500 TABLET, EXTENDED RELEASE ORAL at 06:48

## 2024-01-02 RX ADMIN — NICOTINE POLACRILEX 2 MG: 2 LOZENGE ORAL at 10:51

## 2024-01-02 RX ADMIN — RISPERIDONE 1.5 MG: 3 TABLET ORAL at 20:29

## 2024-01-02 RX ADMIN — NICOTINE POLACRILEX 2 MG: 2 LOZENGE ORAL at 16:19

## 2024-01-02 RX ADMIN — NICOTINE POLACRILEX 2 MG: 2 LOZENGE ORAL at 19:45

## 2024-01-02 RX ADMIN — NICOTINE POLACRILEX 2 MG: 2 LOZENGE ORAL at 07:20

## 2024-01-02 NOTE — GROUP NOTE
Group Therapy Note    Date: 1/2/2024    Group Start Time: 0900  Group End Time: 0920  Group Topic: Community Meeting    Jessi Sellers        Group Therapy Note    Attendees: 5/7       Patient's Goal:  \"Get out of here\"    Notes:  Goal setting    Status After Intervention:  Improved    Participation Level: Minimal    Participation Quality: Attentive      Speech:  normal, pressured, and hesitant      Thought Process/Content: Logical      Affective Functioning: Flat      Mood: anxious      Level of consciousness:  Alert, Oriented x4, and Attentive      Response to Learning: Able to verbalize current knowledge/experience      Endings: None Reported    Modes of Intervention: Education, Support, Socialization, and Exploration      Discipline Responsible: Behavorial Health Tech      Signature:  Jessi Pandey

## 2024-01-02 NOTE — PROGRESS NOTES
Daily Progress Note  1/2/2024    Patient Name: Car Coyle    CHIEF COMPLAINT: Acute psychosis         SUBJECTIVE:      Patient seen face-to-face for follow-up assessment.  He is restless and pacing in his room.  Reports that his thoughts are racing.  He does not feel that he has been sleeping well.  Patient also notes nausea, will add Zofran for this.  Patient states that he has no idea why he is admitted to the hospital.  Very angry towards his parents and states that they lied.  Would provide no details of why he believes this.  Per previous documentation, patient has been behaving bizarrely and aggressively in the community.  He destroyed all of his [place] presents and wrote the word \"rape\" all over $500 check from his grandmother, which was shown to ED staff.  Patient is dismissive and becomes frustrated and he attempts to discuss these behaviors.  Did request to speak with patient's mother or father to gain collateral information.  However, patient refuses.  He is not forthcoming with discussing any psychiatric symptoms.  He is discharge focused.  Compliant with Depakote 500 mg twice daily and was started on risperidone 1.5 mg p.o. twice daily.  Staff reports that patient has been largely isolative.  He did require oral emergency medications last night for agitation.  Patient has not yet demonstrated stability.  Requires continued inpatient hospitalization on PICU for safety.    Appetite:  [] Normal/Adequate/Unchanged  [] Increased  [x] Decreased      Sleep:       [] Normal/Adequate/Unchanged  [] Fair  [x] Poor      Group Attendance on Unit:   [] Yes  [] Selectively    [x] No    Medication Side Effects:  Patient denies any medication side effects at the time of assessment.         Mental Status Exam  Level of consciousness: Alert and awake.   Appearance: Appropriate attire for setting, pacing in his room, with poor grooming and hygiene.   Behavior/Motor: Approachable, bizarre  Attitude toward  Urine 12/31/2023 Normal  0.0 - 1.0 EU/dL Final    Nitrite, Urine 12/31/2023 NEGATIVE  NEGATIVE Final    Leukocyte Esterase, Urine 12/31/2023 NEGATIVE  NEGATIVE Final    Comment 12/31/2023 Microscopic exam not performed based on chemical results unless requested in original order.   Final    Amphetamine Screen, Ur 12/31/2023 NEGATIVE  NEGATIVE Final    Comment:       (Positive cutoff 1000 ng/mL)                  Barbiturate Screen, Ur 12/31/2023 NEGATIVE  NEGATIVE Final    Comment:       (Positive cutoff 200 ng/mL)                  Benzodiazepine Screen, Urine 12/31/2023 NEGATIVE  NEGATIVE Final    Comment:       (Positive cutoff 200 ng/mL)                  Cocaine Metabolite, Urine 12/31/2023 NEGATIVE  NEGATIVE Final    Comment:       (Positive cutoff 300 ng/mL)                  Methadone Screen, Urine 12/31/2023 NEGATIVE  NEGATIVE Final    Comment:       (Positive cutoff 300 ng/mL)                  Opiates, Urine 12/31/2023 NEGATIVE  NEGATIVE Final    Comment:       (Positive cutoff 300 ng/mL)                  Phencyclidine, Urine 12/31/2023 NEGATIVE  NEGATIVE Final    Comment:       (Positive cutoff 25 ng/mL)                  Cannabinoid Scrn, Ur 12/31/2023 POSITIVE (A)  NEGATIVE Final    Comment:       (Positive cutoff 50 ng/mL)                  Oxycodone Screen, Ur 12/31/2023 NEGATIVE  NEGATIVE Final    Comment:       (Positive cutoff 100 ng/mL)                  Fentanyl, Ur 12/31/2023 NEGATIVE  NEGATIVE Final    Comment:       (Positive cutoff  5 ng/ml)            Test Information 12/31/2023 Assay provides medical screening only.  The absence of expected drug(s) and/or metabolite(s) may indicate diluted or adulterated urine, limitations of testing or timing of collection.   Final    Comment: Testing for legal purposes should be confirmed by another method.  To request confirmation   of test result, please call the lab within 7 days of sample submission.      Ventricular Rate 12/31/2023 92  BPM Final    Atrial

## 2024-01-02 NOTE — GROUP NOTE
Group Therapy Note    Date: 1/1/2024    Group Start Time: 1945  Group End Time: 2015  Group Topic: Relaxation    [facility]    Сергей Tellez RN        Group Therapy Note    Attendees: 2/7       Patient did not attend group this evening, despite encouragement from staff. Patient was offered alternative materials, if desired.       Signature:  Сергей Tellez RN     Patient seen in the dialysis unit  Tolerating hemodialysis treatment well  Vital stable  Blood flow rate 400, dialysate flow rate 800  Using 2 calcium bath  Recheck labs in a.m.

## 2024-01-02 NOTE — PLAN OF CARE
Problem: Pain  Goal: Verbalizes/displays adequate comfort level or baseline comfort level  1/2/2024 0140 by Сергей Tellez RN  Outcome: Progressing     Problem: Coping  Goal: Pt/Family able to verbalize concerns and demonstrate effective coping strategies  Description: INTERVENTIONS:  1. Assist patient/family to identify coping skills, available support systems and cultural and spiritual values  2. Provide emotional support, including active listening and acknowledgement of concerns of patient and caregivers  3. Reduce environmental stimuli, as able  4. Instruct patient/family in relaxation techniques, as appropriate  5. Assess for spiritual pain/suffering and initiate Spiritual Care, Psychosocial Clinical Specialist consults as needed  1/2/2024 0140 by Сергей Tellez RN  Outcome: Not progressing      Problem: Depression/Self Harm  Goal: Effect of psychiatric condition will be minimized and patient will be protected from self harm  Description: INTERVENTIONS:  1. Assess impact of patient's symptoms on level of functioning, self care needs and offer support as indicated  2. Assess patient/family knowledge of depression, impact on illness and need for teaching  3. Provide emotional support, presence and reassurance  4. Assess for possible suicidal thoughts or ideation. If patient expresses suicidal thoughts or statements do not leave alone, initiate Suicide Precautions, move to a room close to the nursing station and obtain sitter  5. Initiate consults as appropriate with Mental Health Professional, Spiritual Care, Psychosocial CNS, and consider a recommendation to the [facility] for a Psychiatric Consultation  1/2/2024 0140 by Сергей Tellez RN  Outcome: Progressing    Patient is drowsy, but arousal to verbal stimuli. Patient received agitation PRN medications prior to shift change. Assessment was limited due to being lethargic. Patient did not take his night time medications due to feeling lethargic. Patient's mood is

## 2024-01-02 NOTE — BH NOTE
[facility]  Day 3 Interdisciplinary Treatment Plan NOTE    Review Date & Time: 1/2/24  0900    Admission Type:   Admission Type: Voluntary    Reason for admission:  Reason for Admission: Patient acting erratic at home, impulsive, destroying his ije gifts, not sleeping. Patient hasn't been taking his medications regulary since being discharged last.  Estimated Length of Stay:  5-7 days  Estimated Discharge Date Update:   to be determined by physician    PATIENT STRENGTHS:  Patient Strengths:   Patient Strengths and Limitations:Limitations: Unrealistic self-view, Difficulty problem solving/relies on others to help solve problems, Difficult relationships / poor social skills  Addictive Behavior:Addictive Behavior  In the Past 3 Months, Have You Felt or Has Someone Told You That You Have a Problem With  : None  Medical Problems:History reviewed. No pertinent past medical history.    Risk:  Fall Risk   Norman Scale Norman Scale Score: 22  BVC    Change in scores:  No Changes to plan of Care:  No    Status EXAM:   Mental Status and Behavioral Exam  Normal: No  Level of Assistance: Independent/Self  Facial Expression: Avoids Gaze, Flat  Affect: Constricted  Level of Consciousness: Lethargic  Frequency of Checks: 4 times per hour, close  Mood:Normal: No  Mood: Anxious  Motor Activity:Normal: No  Motor Activity: Increased  Eye Contact: Poor  Observed Behavior: Withdrawn, Preoccupied  Sexual Misconduct History: Current - no  Preception: [place] to person, [place] to time, [place] to place, [place] to situation  Attention:Normal: No  Attention: Distractible  Thought Processes: Loose association  Thought Content:Normal: No  Thought Content: Paranoia, Preoccupations  Depression Symptoms: Impaired concentration, Isolative  Anxiety Symptoms: Generalized  Jacquie Symptoms: No problems reported or observed.  Hallucinations: None  Delusions: Yes  Delusions: Paranoid, Persecutory  Memory:Normal: Yes  Memory: Poor

## 2024-01-02 NOTE — GROUP NOTE
Group Therapy Note    Date: 1/2/2024    Group Start Time: 1100  Group End Time: 1150  Group Topic: Music Therapy    [facility]    Recreational Group Note        Date: 1/2/2024   Start Time: 1100  End Time: 1150      Number of Participants in Group & Unit Census:  1/7    Topic: Initially offered music therapy group and all patients declined. Offered a variety of group, 1:1, and individual leisure opportunities.    Goal of Group:  Patient Increase cognitive stimulation; Increase socialization; Normalization of the environment; Demonstrate positive use of time; Increase rapport with staff;      Comments:     Patient did not participate in Recreational group, despite staff encouragement and explanation of benefits.  Patient remain seclusive to self.  Q15 minute safety checks maintained for patient safety and will continue to encourage patient to attend unit programming.

## 2024-01-02 NOTE — PLAN OF CARE
Problem: Pain  Goal: Verbalizes/displays adequate comfort level or baseline comfort level  1/2/2024 0743 by Catrachito James RN  Outcome: Progressing  Note: Patient reports \"I don't need to be here.\"      Problem: Coping  Goal: Pt/Family able to verbalize concerns and demonstrate effective coping strategies  Description: INTERVENTIONS:  1. Assist patient/family to identify coping skills, available support systems and cultural and spiritual values  2. Provide emotional support, including active listening and acknowledgement of concerns of patient and caregivers  3. Reduce environmental stimuli, as able  4. Instruct patient/family in relaxation techniques, as appropriate  5. Assess for spiritual pain/suffering and initiate Spiritual Care, Psychosocial Clinical Specialist consults as needed  1/2/2024 0743 by Catrachito James RN  Outcome: Progressing     Problem: Depression/Self Harm  Goal: Effect of psychiatric condition will be minimized and patient will be protected from self harm  Description: INTERVENTIONS:  1. Assess impact of patient's symptoms on level of functioning, self care needs and offer support as indicated  2. Assess patient/family knowledge of depression, impact on illness and need for teaching  3. Provide emotional support, presence and reassurance  4. Assess for possible suicidal thoughts or ideation. If patient expresses suicidal thoughts or statements do not leave alone, initiate Suicide Precautions, move to a room close to the nursing station and obtain sitter  5. Initiate consults as appropriate with Mental Health Professional, Spiritual Care, Psychosocial CNS, and consider a recommendation to the [facility] for a Psychiatric Consultation  1/2/2024 0743 by Catrachito James RN  Outcome: Progressing  Note: Patient is free of self harm at this time.  Patient agrees to seek out staff if thoughts to harm self arise.  Staff will provide support and reassurance as needed.  Safety checks maintained every 15

## 2024-01-02 NOTE — GROUP NOTE
Group Therapy Note    Date: 1/2/2024    Group Start Time: 1430  Group End Time: 1515  Group Topic: Recreational    [facility] Mike Frank    Recreation Group Note        Date: 1/2/2024   Start Time: 1430  End Time: 1515      Number of Participants in Group & Unit Census:  1/7    Topic: Offered a variety of group, 1:1, and individual leisure opportunities.     Goal of Group: Patient Increase cognitive stimulation; Increase socialization; Normalization of the environment; Demonstrate positive use of time; Increase rapport with staff;      Comments:     Patient did not participate in Recreation group, despite staff encouragement and explanation of benefits.  Patient remain seclusive to self.  Q15 minute safety checks maintained for patient safety and will continue to encourage patient to attend unit programming.

## 2024-01-03 PROBLEM — F29 PSYCHOSIS (HCC): Status: ACTIVE | Noted: 2022-04-19

## 2024-01-03 PROCEDURE — 99232 SBSQ HOSP IP/OBS MODERATE 35: CPT | Performed by: PSYCHIATRY & NEUROLOGY

## 2024-01-03 PROCEDURE — APPSS30 APP SPLIT SHARED TIME 16-30 MINUTES

## 2024-01-03 PROCEDURE — 6370000000 HC RX 637 (ALT 250 FOR IP): Performed by: PSYCHIATRY & NEUROLOGY

## 2024-01-03 PROCEDURE — 2040000000 HC PSYCH ICU R&B

## 2024-01-03 PROCEDURE — 6370000000 HC RX 637 (ALT 250 FOR IP): Performed by: NURSE PRACTITIONER

## 2024-01-03 PROCEDURE — 6370000000 HC RX 637 (ALT 250 FOR IP)

## 2024-01-03 RX ORDER — ARIPIPRAZOLE 15 MG/1
15 TABLET ORAL DAILY
Status: DISCONTINUED | OUTPATIENT
Start: 2024-01-03 | End: 2024-01-06 | Stop reason: HOSPADM

## 2024-01-03 RX ADMIN — HYDROXYZINE HYDROCHLORIDE 50 MG: 50 TABLET, FILM COATED ORAL at 20:14

## 2024-01-03 RX ADMIN — HYDROXYZINE HYDROCHLORIDE 50 MG: 50 TABLET, FILM COATED ORAL at 10:40

## 2024-01-03 RX ADMIN — DIVALPROEX SODIUM 500 MG: 500 TABLET, EXTENDED RELEASE ORAL at 15:37

## 2024-01-03 RX ADMIN — ACETAMINOPHEN 650 MG: 325 TABLET ORAL at 20:13

## 2024-01-03 RX ADMIN — DIVALPROEX SODIUM 500 MG: 500 TABLET, EXTENDED RELEASE ORAL at 06:30

## 2024-01-03 RX ADMIN — TRAZODONE HYDROCHLORIDE 50 MG: 50 TABLET ORAL at 20:14

## 2024-01-03 RX ADMIN — ARIPIPRAZOLE 15 MG: 15 TABLET ORAL at 13:46

## 2024-01-03 RX ADMIN — NICOTINE POLACRILEX 2 MG: 2 LOZENGE ORAL at 10:07

## 2024-01-03 RX ADMIN — NICOTINE POLACRILEX 2 MG: 2 LOZENGE ORAL at 07:03

## 2024-01-03 RX ADMIN — NICOTINE POLACRILEX 2 MG: 2 LOZENGE ORAL at 13:46

## 2024-01-03 RX ADMIN — HYDROXYZINE HYDROCHLORIDE 50 MG: 50 TABLET, FILM COATED ORAL at 06:30

## 2024-01-03 RX ADMIN — NICOTINE POLACRILEX 2 MG: 2 LOZENGE ORAL at 20:13

## 2024-01-03 RX ADMIN — IBUPROFEN 400 MG: 400 TABLET, FILM COATED ORAL at 08:26

## 2024-01-03 RX ADMIN — RISPERIDONE 1.5 MG: 3 TABLET ORAL at 08:01

## 2024-01-03 RX ADMIN — NICOTINE POLACRILEX 2 MG: 2 LOZENGE ORAL at 16:05

## 2024-01-03 RX ADMIN — NICOTINE POLACRILEX 2 MG: 2 LOZENGE ORAL at 12:26

## 2024-01-03 RX ADMIN — NICOTINE POLACRILEX 2 MG: 2 LOZENGE ORAL at 18:15

## 2024-01-03 ASSESSMENT — PAIN SCALES - GENERAL: PAINLEVEL_OUTOF10: 1

## 2024-01-03 ASSESSMENT — PAIN DESCRIPTION - LOCATION: LOCATION: HEAD

## 2024-01-03 NOTE — PROGRESS NOTES
Daily Progress Note  1/3/2024    Patient Name: Car Coyle    CHIEF COMPLAINT: Acute psychosis         SUBJECTIVE:      Patient seen face-to-face for follow-up assessment at bedside, acceptance and need for privacy.  On approach patient is restless, pacing, irritable and discharged focused.  He is delusional, has poor insight and blames others for her own behaviors resulting to need for hospitalization.  He is extremely angry towards mom who he states is the wand that has mental illness not him.  Patient has been compliant with scheduled Risperdal and Depakote.  Per father patient does better on Abilify versus Risperdal thus Risperdal discontinued, added Abilify 15 mg p.o. daily which was noted the patient to have been compliant today.  Also patient was accepting to long-acting injections Aristada Initio 675 mg in 1064 mg today.  Patient has not required any emergency medication last 24 hours.  It was noted the patient /87, heart rate 90, denies any hypertensive related symptoms. Patient has yet to demonstrate stability thus requires inpatient hospitalization on PICU unit for safety and stabilization.     Appetite:  [] Normal/Adequate/Unchanged  [] Increased  [x] Decreased      Sleep:       [] Normal/Adequate/Unchanged  [] Fair  [x] Poor      Group Attendance on Unit:   [] Yes  [] Selectively    [x] No    Medication Side Effects:  Patient denies any medication side effects at the time of assessment.         Mental Status Exam  Level of consciousness: Alert and awake.   Appearance: Appropriate attire for setting, pacing in his room, with poor grooming and hygiene.   Behavior/Motor: Approachable, bizarre  Attitude toward examiner: Attempts to be cooperative, seems distracted, poor eye contact.  Speech: Rapid, pressured   Mood:  Patient reports \"I'm fine\".   Affect: Elevated  Thought processes: Disorganized, illogical delusions  Thought content: Denies homicidal ideation.  Suicidal Ideation: Denies  NEGATIVE  NEGATIVE Final    Comment:       (Positive cutoff 1000 ng/mL)                  Barbiturate Screen, Ur 12/31/2023 NEGATIVE  NEGATIVE Final    Comment:       (Positive cutoff 200 ng/mL)                  Benzodiazepine Screen, Urine 12/31/2023 NEGATIVE  NEGATIVE Final    Comment:       (Positive cutoff 200 ng/mL)                  Cocaine Metabolite, Urine 12/31/2023 NEGATIVE  NEGATIVE Final    Comment:       (Positive cutoff 300 ng/mL)                  Methadone Screen, Urine 12/31/2023 NEGATIVE  NEGATIVE Final    Comment:       (Positive cutoff 300 ng/mL)                  Opiates, Urine 12/31/2023 NEGATIVE  NEGATIVE Final    Comment:       (Positive cutoff 300 ng/mL)                  Phencyclidine, Urine 12/31/2023 NEGATIVE  NEGATIVE Final    Comment:       (Positive cutoff 25 ng/mL)                  Cannabinoid Scrn, Ur 12/31/2023 POSITIVE (A)  NEGATIVE Final    Comment:       (Positive cutoff 50 ng/mL)                  Oxycodone Screen, Ur 12/31/2023 NEGATIVE  NEGATIVE Final    Comment:       (Positive cutoff 100 ng/mL)                  Fentanyl, Ur 12/31/2023 NEGATIVE  NEGATIVE Final    Comment:       (Positive cutoff  5 ng/ml)            Test Information 12/31/2023 Assay provides medical screening only.  The absence of expected drug(s) and/or metabolite(s) may indicate diluted or adulterated urine, limitations of testing or timing of collection.   Final    Comment: Testing for legal purposes should be confirmed by another method.  To request confirmation   of test result, please call the lab within 7 days of sample submission.      Ventricular Rate 12/31/2023 92  BPM Final    Atrial Rate 12/31/2023 92  BPM Final    P-R Interval 12/31/2023 104  ms Final    QRS Duration 12/31/2023 100  ms Final    Q-T Interval 12/31/2023 358  ms Final    QTc Calculation (Bazett) 12/31/2023 442  ms Final    P Axis 12/31/2023 56  degrees Final    R Axis 12/31/2023 86  degrees Final    T Axis 12/31/2023 36  degrees Final

## 2024-01-03 NOTE — GROUP NOTE
Group Therapy Note    Date: 1/3/2024    Group Start Time: 1330  Group End Time: 1400  Group Topic: Recreational    Mike Porter    Recreational Group Note        Date: 1/3/2024 Start Time: 1330  End Time: 1400      Number of Participants in Group & Unit Census:  1/5    Topic: Offered patients a variety of group, 1:1, and individual activities    Goal of Group:Increase socialization; increase sense of community; Demonstrate positive use of time      Comments:     Patient did not participate in Recreation group, despite staff encouragement and explanation of benefits.  Patient remain seclusive to self.  Q15 minute safety checks maintained for patient safety and will continue to encourage patient to attend unit programming.

## 2024-01-03 NOTE — GROUP NOTE
Group Therapy Note    Date: 1/3/2024    Group Start Time: 1100  Group End Time: 1135  Group Topic: Psychoeducation    [facility] Frank        Group Therapy Note    Attendees: 2/7       Patient's Goal:  Patients engaged in agree or disagree style conversation group, sharing their opinions on topics presented by this writer. Engaged in card game as they discussed topics. Engaged in reflection on effective communication skills, and qualities of conversations that make them go well or go poorly. Goals to reflect on effective communication skills; Increase sense of community; Increase self-expression; Increase sense socialization; Demonstrate positive use of time;    Notes:  Patient attended and participated in group having positive interactions with peers and staff. Reflected appropriately on topics presented, expanding on ideas and expressing rationale for their opinions. Patient left after about 15 minutes stating he needed a glass of water and then did not return.     Status After Intervention:  Improved    Participation Level: Interactive and Minimal    Participation Quality: Appropriate, Attentive, and Sharing      Speech:  normal      Thought Process/Content: Logical  Linear      Affective Functioning: Congruent      Mood: dysphoric      Level of consciousness:  Alert      Response to Learning: Resistant      Endings: None Reported    Modes of Intervention: Education, Socialization, Exploration, Activity, and Reality-testing      Discipline Responsible: Psychoeducational Specialist      Signature:  Mike Simons

## 2024-01-03 NOTE — PLAN OF CARE
Problem: Coping  Goal: Pt/Family able to verbalize concerns and demonstrate effective coping strategies  Description: INTERVENTIONS:  1. Assist patient/family to identify coping skills, available support systems and cultural and spiritual values  2. Provide emotional support, including active listening and acknowledgement of concerns of patient and caregivers  3. Reduce environmental stimuli, as able  4. Instruct patient/family in relaxation techniques, as appropriate  5. Assess for spiritual pain/suffering and initiate Spiritual Care, Psychosocial Clinical Specialist consults as needed  Outcome: Not Progressing  Flowsheets (Taken 1/1/2024 1729)  Patient/family able to verbalize anxieties, fears, and concerns, and demonstrate effective coping:   Assist patient/family to identify coping skills, available support systems and cultural and spiritual values   Provide emotional support, including active listening and acknowledgement of concerns of patient and caregivers   Reduce environmental stimuli, as able   Instruct patient/family in relaxation techniques, as appropriate  Note: Patient is medication compliant and is doing better coping. Will provide emotional support, and active listening along with educational support.      Problem: Pain  Goal: Verbalizes/displays adequate comfort level or baseline comfort level  Outcome: Progressing     Problem: Depression/Self Harm  Goal: Effect of psychiatric condition will be minimized and patient will be protected from self harm  Description: INTERVENTIONS:  1. Assess impact of patient's symptoms on level of functioning, self care needs and offer support as indicated  2. Assess patient/family knowledge of depression, impact on illness and need for teaching  3. Provide emotional support, presence and reassurance  4. Assess for possible suicidal thoughts or ideation. If patient expresses suicidal thoughts or statements do not leave alone, initiate Suicide Precautions, move to a

## 2024-01-03 NOTE — PLAN OF CARE
Problem: Coping  Goal: Pt/Family able to verbalize concerns and demonstrate effective coping strategies  Description: INTERVENTIONS:  1. Assist patient/family to identify coping skills, available support systems and cultural and spiritual values  2. Provide emotional support, including active listening and acknowledgement of concerns of patient and caregivers  3. Reduce environmental stimuli, as able  4. Instruct patient/family in relaxation techniques, as appropriate  5. Assess for spiritual pain/suffering and initiate Spiritual Care, Psychosocial Clinical Specialist consults as needed  Outcome: Progressing    Patient was able to verbalize concerns and was able to verbalize effective coping strategies.     Problem: Depression/Self Harm  Goal: Effect of psychiatric condition will be minimized and patient will be protected from self harm  Description: INTERVENTIONS:  1. Assess impact of patient's symptoms on level of functioning, self care needs and offer support as indicated  2. Assess patient/family knowledge of depression, impact on illness and need for teaching  3. Provide emotional support, presence and reassurance  4. Assess for possible suicidal thoughts or ideation. If patient expresses suicidal thoughts or statements do not leave alone, initiate Suicide Precautions, move to a room close to the nursing station and obtain sitter  5. Initiate consults as appropriate with Mental Health Professional, Spiritual Care, Psychosocial CNS, and consider a recommendation to the [facility] for a Psychiatric Consultation  Outcome: Progressing    Patient denies any suicidal thoughts. Patient remains free from self harm.

## 2024-01-04 PROCEDURE — 6370000000 HC RX 637 (ALT 250 FOR IP): Performed by: NURSE PRACTITIONER

## 2024-01-04 PROCEDURE — 2040000000 HC PSYCH ICU R&B

## 2024-01-04 PROCEDURE — 99232 SBSQ HOSP IP/OBS MODERATE 35: CPT | Performed by: PSYCHIATRY & NEUROLOGY

## 2024-01-04 PROCEDURE — 6370000000 HC RX 637 (ALT 250 FOR IP): Performed by: PSYCHIATRY & NEUROLOGY

## 2024-01-04 PROCEDURE — APPSS30 APP SPLIT SHARED TIME 16-30 MINUTES

## 2024-01-04 RX ADMIN — DIVALPROEX SODIUM 500 MG: 500 TABLET, EXTENDED RELEASE ORAL at 15:34

## 2024-01-04 RX ADMIN — ARIPIPRAZOLE 15 MG: 15 TABLET ORAL at 07:38

## 2024-01-04 RX ADMIN — NICOTINE POLACRILEX 2 MG: 2 LOZENGE ORAL at 06:45

## 2024-01-04 RX ADMIN — HYDROXYZINE HYDROCHLORIDE 50 MG: 50 TABLET, FILM COATED ORAL at 12:14

## 2024-01-04 RX ADMIN — ACETAMINOPHEN 650 MG: 325 TABLET ORAL at 09:50

## 2024-01-04 RX ADMIN — DIVALPROEX SODIUM 500 MG: 500 TABLET, EXTENDED RELEASE ORAL at 05:52

## 2024-01-04 RX ADMIN — NICOTINE POLACRILEX 2 MG: 2 LOZENGE ORAL at 13:55

## 2024-01-04 RX ADMIN — NICOTINE POLACRILEX 2 MG: 2 LOZENGE ORAL at 15:59

## 2024-01-04 RX ADMIN — HYDROXYZINE HYDROCHLORIDE 50 MG: 50 TABLET, FILM COATED ORAL at 05:52

## 2024-01-04 RX ADMIN — IBUPROFEN 400 MG: 400 TABLET, FILM COATED ORAL at 13:06

## 2024-01-04 RX ADMIN — IBUPROFEN 400 MG: 400 TABLET, FILM COATED ORAL at 02:19

## 2024-01-04 RX ADMIN — NICOTINE POLACRILEX 2 MG: 2 LOZENGE ORAL at 18:18

## 2024-01-04 ASSESSMENT — PAIN SCALES - GENERAL
PAINLEVEL_OUTOF10: 2
PAINLEVEL_OUTOF10: 6
PAINLEVEL_OUTOF10: 1

## 2024-01-04 ASSESSMENT — PAIN DESCRIPTION - DESCRIPTORS
DESCRIPTORS: ACHING
DESCRIPTORS: ACHING

## 2024-01-04 ASSESSMENT — PAIN DESCRIPTION - LOCATION
LOCATION: SHOULDER
LOCATION: HEAD
LOCATION: HAND

## 2024-01-04 NOTE — GROUP NOTE
Group Therapy Note    Date: 1/4/2024    Group Start Time: 0900  Group End Time: 0920  Group Topic: Community Meeting    Jessi Sellers        Group Therapy Note    Attendees: 4/6       Patient's Goal:  Go home    Notes:  Goal setting    Status After Intervention:  Improved    Participation Level: Interactive    Participation Quality: Appropriate      Speech:  normal      Thought Process/Content: Logical      Affective Functioning: Congruent      Mood: anxious      Level of consciousness:  Alert      Response to Learning: Able to verbalize current knowledge/experience      Endings: None Reported    Modes of Intervention: Education, Support, Socialization, and Exploration      Discipline Responsible: Behavorial Health Tech      Signature:  Jessi Pandey

## 2024-01-04 NOTE — PROGRESS NOTES
Daily Progress Note  1/4/2024    Patient Name: Car Coyle    CHIEF COMPLAINT: Acute psychosis         SUBJECTIVE:      Patient seen face-to-face for follow-up assessment. When approached, patient accepted need for privacy.  Interview occurred privately in patients room.  Patient reports feeling better however endorses ongoing stressors of noise on the PICU unit.  Patient is agreeable to conversation about his anger and reasons for admission and patient starts by reporting he is here because of his mom and then went on to elaborate that his mom was seeing schizophrenic symptoms.  Patient states he may have been presenting with some and the reports of anger may have been true.  Patient is encouraged that this self recognition is a step in the right direction towards ability to contract for safety.  Patient is currently denying depression and suicidal ideation.  Patient continues to present as somewhat elevated however has not required emergency medication in the past 24 hours.  Patient is denying medication side effects.  Patient has yet to demonstrate stability in symptoms and continues to require support in the hospital.      Appetite:  [x] Adequate/Unchanged  [] Increased  [] Decreased      Sleep:       [x] Adequate/Unchanged  [] Fair  [] Poor      Group Attendance on Unit:   [] Yes   [x] Selectively    [] No    Compliant with scheduled medications: [x] Yes  [] No    Received emergency medications in past 24 hrs: [] Yes   [x] No    Medication Side Effects: Denies           Mental Status Exam  Level of consciousness: Alert and awake.   Appearance: Appropriate attire for setting, pacing in his room, with poor grooming and hygiene.   Behavior/Motor: Approachable, more agreeable to interview  Attitude toward examiner: Improved cooperation, less distracted, poor eye contact.  Speech: Pressured, normal volume  Mood:  Patient reports \"better\".   Affect: More congruent  Thought processes: Coherent,  3:20 PM    **This report has been created using voice recognition software. It may contain minor errors which are inherent in voice recognition technology.**                     I independently saw and evaluated the patient.  I reviewed the  documentation of the NP.  Any additional comments or changes to the    documentation are stated below otherwise agree with assessment.      The patient's insight into his illness remains partial.  He has no side effects from the long-acting injections he received yesterday.  He continues to be somewhat overactive.      PLAN  Medications as noted above  Attempt to develop insight  Expected Length of Stay is 2-6 days.   Psycho-education conducted.  Supportive Therapy conducted.  Follow-up daily while on inpatient unit    Electronically signed by KAROLINE REID MD on 1/4/24 at 6:51 PM EST

## 2024-01-04 NOTE — PLAN OF CARE
Problem: Coping  Goal: Pt/Family able to verbalize concerns and demonstrate effective coping strategies  Description: INTERVENTIONS:  1. Assist patient/family to identify coping skills, available support systems and cultural and spiritual values  2. Provide emotional support, including active listening and acknowledgement of concerns of patient and caregivers  3. Reduce environmental stimuli, as able  4. Instruct patient/family in relaxation techniques, as appropriate  5. Assess for spiritual pain/suffering and initiate Spiritual Care, Psychosocial Clinical Specialist consults as needed  1/4/2024 0922 by Jessi Pandey  Outcome: Progressing     Problem: Depression/Self Harm  Goal: Effect of psychiatric condition will be minimized and patient will be protected from self harm  Description: INTERVENTIONS:  1. Assess impact of patient's symptoms on level of functioning, self care needs and offer support as indicated  2. Assess patient/family knowledge of depression, impact on illness and need for teaching  3. Provide emotional support, presence and reassurance  4. Assess for possible suicidal thoughts or ideation. If patient expresses suicidal thoughts or statements do not leave alone, initiate Suicide Precautions, move to a room close to the nursing station and obtain sitter  5. Initiate consults as appropriate with Mental Health Professional, Spiritual Care, Psychosocial CNS, and consider a recommendation to the [facility] for a Psychiatric Consultation  1/4/2024 0922 by Jessi Pandey  Outcome: Progressing    Patient is brighter and more controlled. Has hyper moments, where he runs in the hallway, short attention span, complains of feeling restless and not being able to settle, in the morning. Takes medications and takes a shower, and is able to rest. Talk time done to discuss his plans for maintaining self after d/c and he relates improved insight stating that he knows he needs to stay on medications to help  keep himself calm. He recognizes when his anxiety and edginess begin to rise, but struggles controlling that, especially with his parents. Accepting of discussion and support. Denies any suicidal thoughts or homicidal thoughts. Thoughts are clearer overall.

## 2024-01-04 NOTE — GROUP NOTE
Group Therapy Note    Date: 1/4/2024    Group Start Time: 1100  Group End Time: 1138  Group Topic: Music Therapy    [facility] Morrow        Group Therapy Note    Attendees: 4/6     Patient's Goal:  Patient shared music and analyzed lyrics for themes, offering advice based on themes within their music selections. Goals to increase socialization; Engage in peers support; Increase sense of community; normalization of the environment; Demonstrate positive use of time; Increase self-expression    Notes:  Patient attended and participated in group. Was pleasant and engaging, and shared music. Left after his first song and did not share advice. Returned shortly after. Shared another song later in group, and patient stated \"you've got to keep your head up.\" Talked about things he does to keep himself motivated and that when he is \"feeling pain\" it motivated him to do better and try to change something in his life.     Status After Intervention:  Improved    Participation Level: Active Listener and Interactive    Participation Quality: Appropriate, Attentive, and Sharing      Speech:  normal      Thought Process/Content: Logical  Linear      Affective Functioning: Congruent      Mood: euthymic      Level of consciousness:  Alert and Attentive      Response to Learning: Able to verbalize current knowledge/experience and Progressing to goal      Endings: None Reported    Modes of Intervention: Support, Socialization, Exploration, Activity, Media, and Reality-testing      Discipline Responsible: Psychoeducational Specialist      Signature:  Mike Simons

## 2024-01-04 NOTE — GROUP NOTE
Group Therapy Note    Date: 1/4/2024    Group Start Time: 1330  Group End Time: 1400  Group Topic: Recreational    [facility] Ramires    Recreational Group Note        Date: 1/4/2024 Start Time: 1330  End Time: 1400      Number of Participants in Group & Unit Census:  1/6    Topic: Patients offered a variety of group, 1:1, and individual leisure opportunities. Group had to terminated due to disruptive patient in milieu and show of support from staff    Goal of Group: Increase socialization; Increase sense of community; Increase rapport with staff; Demonstrate positive use of time;       Comments:     Patient did not participate in Recreation group, despite staff encouragement and explanation of benefits.  Patient remain seclusive to self.  Q15 minute safety checks maintained for patient safety and will continue to encourage patient to attend unit programming.

## 2024-01-04 NOTE — PLAN OF CARE
Problem: Coping  Goal: Pt/Family able to verbalize concerns and demonstrate effective coping strategies  Description: INTERVENTIONS:  1. Assist patient/family to identify coping skills, available support systems and cultural and spiritual values  2. Provide emotional support, including active listening and acknowledgement of concerns of patient and caregivers  3. Reduce environmental stimuli, as able  4. Instruct patient/family in relaxation techniques, as appropriate  5. Assess for spiritual pain/suffering and initiate Spiritual Care, Psychosocial Clinical Specialist consults as needed  1/4/2024 0022 by Danie Boo, RN  Outcome: Progressing    Patient is able to verbalize concers regurading care and demonstrates appropriate responses to stressors.     Problem: Depression/Self Harm  Goal: Effect of psychiatric condition will be minimized and patient will be protected from self harm  Description: INTERVENTIONS:  1. Assess impact of patient's symptoms on level of functioning, self care needs and offer support as indicated  2. Assess patient/family knowledge of depression, impact on illness and need for teaching  3. Provide emotional support, presence and reassurance  4. Assess for possible suicidal thoughts or ideation. If patient expresses suicidal thoughts or statements do not leave alone, initiate Suicide Precautions, move to a room close to the nursing station and obtain sitter  5. Initiate consults as appropriate with Mental Health Professional, Spiritual Care, Psychosocial CNS, and consider a recommendation to the [facility] for a Psychiatric Consultation  1/4/2024 0022 by Danie Boo, RN  Outcome: Progressing    Patient denies any suicidal thoughts at this time. Patient remains free from self harm.

## 2024-01-05 PROCEDURE — 6370000000 HC RX 637 (ALT 250 FOR IP): Performed by: PSYCHIATRY & NEUROLOGY

## 2024-01-05 PROCEDURE — 1240000000 HC EMOTIONAL WELLNESS R&B

## 2024-01-05 PROCEDURE — 99232 SBSQ HOSP IP/OBS MODERATE 35: CPT | Performed by: PSYCHIATRY & NEUROLOGY

## 2024-01-05 PROCEDURE — 6370000000 HC RX 637 (ALT 250 FOR IP): Performed by: NURSE PRACTITIONER

## 2024-01-05 PROCEDURE — APPSS30 APP SPLIT SHARED TIME 16-30 MINUTES

## 2024-01-05 RX ORDER — TRAZODONE HYDROCHLORIDE 50 MG/1
50 TABLET ORAL NIGHTLY PRN
Qty: 30 TABLET | Refills: 0 | Status: ON HOLD | OUTPATIENT
Start: 2024-01-05 | End: 2024-01-22

## 2024-01-05 RX ORDER — DIVALPROEX SODIUM 500 MG/1
500 TABLET, EXTENDED RELEASE ORAL
Qty: 30 TABLET | Refills: 3 | Status: ON HOLD | OUTPATIENT
Start: 2024-01-06 | End: 2024-01-22 | Stop reason: HOSPADM

## 2024-01-05 RX ADMIN — NICOTINE POLACRILEX 2 MG: 2 LOZENGE ORAL at 19:52

## 2024-01-05 RX ADMIN — NICOTINE POLACRILEX 2 MG: 2 LOZENGE ORAL at 16:15

## 2024-01-05 RX ADMIN — DIVALPROEX SODIUM 500 MG: 500 TABLET, EXTENDED RELEASE ORAL at 16:15

## 2024-01-05 RX ADMIN — NICOTINE POLACRILEX 2 MG: 2 LOZENGE ORAL at 06:14

## 2024-01-05 RX ADMIN — DIVALPROEX SODIUM 500 MG: 500 TABLET, EXTENDED RELEASE ORAL at 06:14

## 2024-01-05 RX ADMIN — HYDROXYZINE HYDROCHLORIDE 50 MG: 50 TABLET, FILM COATED ORAL at 20:40

## 2024-01-05 RX ADMIN — NICOTINE POLACRILEX 2 MG: 2 LOZENGE ORAL at 14:08

## 2024-01-05 RX ADMIN — NICOTINE POLACRILEX 2 MG: 2 LOZENGE ORAL at 11:31

## 2024-01-05 RX ADMIN — HYDROXYZINE HYDROCHLORIDE 50 MG: 50 TABLET, FILM COATED ORAL at 00:07

## 2024-01-05 RX ADMIN — IBUPROFEN 400 MG: 400 TABLET, FILM COATED ORAL at 20:26

## 2024-01-05 RX ADMIN — ARIPIPRAZOLE 15 MG: 15 TABLET ORAL at 07:33

## 2024-01-05 RX ADMIN — ACETAMINOPHEN 650 MG: 325 TABLET ORAL at 14:14

## 2024-01-05 RX ADMIN — TRAZODONE HYDROCHLORIDE 50 MG: 50 TABLET ORAL at 20:40

## 2024-01-05 RX ADMIN — TRAZODONE HYDROCHLORIDE 50 MG: 50 TABLET ORAL at 00:07

## 2024-01-05 RX ADMIN — IBUPROFEN 400 MG: 400 TABLET, FILM COATED ORAL at 10:52

## 2024-01-05 ASSESSMENT — PAIN SCALES - GENERAL
PAINLEVEL_OUTOF10: 1
PAINLEVEL_OUTOF10: 3
PAINLEVEL_OUTOF10: 1
PAINLEVEL_OUTOF10: 3

## 2024-01-05 ASSESSMENT — PAIN DESCRIPTION - LOCATION
LOCATION: HEAD
LOCATION: HEAD

## 2024-01-05 ASSESSMENT — PAIN DESCRIPTION - DESCRIPTORS: DESCRIPTORS: ACHING

## 2024-01-05 ASSESSMENT — PAIN SCALES - WONG BAKER: WONGBAKER_NUMERICALRESPONSE: 0

## 2024-01-05 NOTE — GROUP NOTE
Group Therapy Note    Date: 1/5/2024    Group Start Time: 1100  Group End Time: 1130  Group Topic: Music Therapy    [facility] Mike Ortega        Group Therapy Note    Attendees: 3/6     Patient's Goal:  Patients engaged in education on structure and uses of positive affirmations skill. Patients were asked to share songs that they feel reflects something positive about themselves, framing these ideas in the form of \"I\" positive affirmations.  Goals to improve self-esteem; Increase self-expression; Normalization of the environment; Demonstrate positive use of time; Increase sense of community;     Notes:  Patient attended and participated in group having positive interactions with peers and staff throughout. Patient engaged appropriately in education and reflection, and able to identify importance and use of positive affirmations. Shared music and appropriate affirmations about himself relating to song selection. Shared affirmations about staying physically healthy, which helps his mental health as well; maintaining a positive mindset; and using affirmation to \"manifest\" his goals and having confidence in his abilities to accomplish goals    Status After Intervention:  Improved    Participation Level: Active Listener and Interactive    Participation Quality: Appropriate, Attentive, and Sharing      Speech:  normal      Thought Process/Content: Logical  Linear      Affective Functioning: Congruent      Mood: euthymic      Level of consciousness:  Alert and Attentive      Response to Learning: Able to verbalize current knowledge/experience, Able to verbalize/acknowledge new learning, Capable of insight, and Progressing to goal      Endings: None Reported    Modes of Intervention: Education, Support, Socialization, Exploration, Activity, Media, and Reality-testing      Discipline Responsible: Psychoeducational Specialist      Signature:  Mkie  Ronak

## 2024-01-05 NOTE — PROGRESS NOTES
Daily Progress Note  1/5/2024    Patient Name: Car Coyle    CHIEF COMPLAINT: Acute psychosis         SUBJECTIVE:      Patient seen face-to-face for follow-up assessment at bedside, acceptance and need for privacy.  Patient reports continuing to feel better and is able to have discussion again today about reasons for admission.  Patient states he is here because he needs a therapist, he is seeking help and needs to take medication.  Patient discussed safety concerns regarding his anger and aggression towards others.  Patient feels that being in the hospital is helpful and states anger continues to improve.  Patient is denying medication side effects and states he notes he feels more relaxed.  Patient does not require emergency medication.  Patient was moved off of PICU today and continues to show improvement and stability.  Patient denies depression, suicidal ideation and is reporting no perceptual disturbances.  Patient has yet to demonstrate stability over extended period of time however improvement is noted.      Appetite:  [x] Adequate/Unchanged  [] Increased  [] Decreased      Sleep:       [x] Adequate/Unchanged  [] Fair  [] Poor      Group Attendance on Unit:   [] Yes   [x] Selectively    [] No    Compliant with scheduled medications: [x] Yes  [] No    Received emergency medications in past 24 hrs: [] Yes   [x] No    Medication Side Effects: Denies         Mental Status Exam  Level of consciousness: Alert and awake.   Appearance: Appropriate attire for setting, resting in bed, with fair  grooming and hygiene.   Behavior/Motor: Approachable, engages during interview  Attitude toward examiner: cooperative, pleasant, good eye contact.  Speech: Normal volume, rate  Mood:  Patient reports \"better\".   Affect: Congruent  Thought processes: Linear, goal directed, coherent  Thought content: Denies homicidal ideation.  Suicidal Ideation: Denies suicidal ideations  Delusions: Paranoid and persecutory  cutoff 1000 ng/mL)                  Barbiturate Screen, Ur 12/31/2023 NEGATIVE  NEGATIVE Final    Comment:       (Positive cutoff 200 ng/mL)                  Benzodiazepine Screen, Urine 12/31/2023 NEGATIVE  NEGATIVE Final    Comment:       (Positive cutoff 200 ng/mL)                  Cocaine Metabolite, Urine 12/31/2023 NEGATIVE  NEGATIVE Final    Comment:       (Positive cutoff 300 ng/mL)                  Methadone Screen, Urine 12/31/2023 NEGATIVE  NEGATIVE Final    Comment:       (Positive cutoff 300 ng/mL)                  Opiates, Urine 12/31/2023 NEGATIVE  NEGATIVE Final    Comment:       (Positive cutoff 300 ng/mL)                  Phencyclidine, Urine 12/31/2023 NEGATIVE  NEGATIVE Final    Comment:       (Positive cutoff 25 ng/mL)                  Cannabinoid Scrn, Ur 12/31/2023 POSITIVE (A)  NEGATIVE Final    Comment:       (Positive cutoff 50 ng/mL)                  Oxycodone Screen, Ur 12/31/2023 NEGATIVE  NEGATIVE Final    Comment:       (Positive cutoff 100 ng/mL)                  Fentanyl, Ur 12/31/2023 NEGATIVE  NEGATIVE Final    Comment:       (Positive cutoff  5 ng/ml)            Test Information 12/31/2023 Assay provides medical screening only.  The absence of expected drug(s) and/or metabolite(s) may indicate diluted or adulterated urine, limitations of testing or timing of collection.   Final    Comment: Testing for legal purposes should be confirmed by another method.  To request confirmation   of test result, please call the lab within 7 days of sample submission.      Ventricular Rate 12/31/2023 92  BPM Final    Atrial Rate 12/31/2023 92  BPM Final    P-R Interval 12/31/2023 104  ms Final    QRS Duration 12/31/2023 100  ms Final    Q-T Interval 12/31/2023 358  ms Final    QTc Calculation (Bazett) 12/31/2023 442  ms Final    P Axis 12/31/2023 56  degrees Final    R Axis 12/31/2023 86  degrees Final    T Axis 12/31/2023 36  degrees Final    Troponin, High Sensitivity 12/31/2023 7  0 - 22 ng/L

## 2024-01-05 NOTE — GROUP NOTE
Psych-Ed/Relapse Prevention Group Note        Date: January 5, 2024 Start Time: 1:30pm  End Time:  2:15pm      Number of Participants in Group & Unit Census:  8/18    Topic: Socialization and Peer Support    Goal of Group:Patient will demonstrate improved interpersonal skills and offer peer support.       Comments:     Patient did not participate in Psych-Ed/Relapse Prevention group, despite staff encouragement and explanation of benefits.  Patient remain seclusive to self.  Q15 minute safety checks maintained for patient safety and will continue to encourage patient to attend unit programming.         Signature:  ABE LindseyS

## 2024-01-05 NOTE — PLAN OF CARE
Problem: Pain  Goal: Verbalizes/displays adequate comfort level or baseline comfort level  Outcome: Progressing     Problem: Coping  Goal: Pt/Family able to verbalize concerns and demonstrate effective coping strategies  Description: INTERVENTIONS:  1. Assist patient/family to identify coping skills, available support systems and cultural and spiritual values  2. Provide emotional support, including active listening and acknowledgement of concerns of patient and caregivers  3. Reduce environmental stimuli, as able  4. Instruct patient/family in relaxation techniques, as appropriate  5. Assess for spiritual pain/suffering and initiate Spiritual Care, Psychosocial Clinical Specialist consults as needed  Outcome: Progressing     Problem: Depression/Self Harm  Goal: Effect of psychiatric condition will be minimized and patient will be protected from self harm  Description: INTERVENTIONS:  1. Assess impact of patient's symptoms on level of functioning, self care needs and offer support as indicated  2. Assess patient/family knowledge of depression, impact on illness and need for teaching  3. Provide emotional support, presence and reassurance  4. Assess for possible suicidal thoughts or ideation. If patient expresses suicidal thoughts or statements do not leave alone, initiate Suicide Precautions, move to a room close to the nursing station and obtain sitter  5. Initiate consults as appropriate with Mental Health Professional, Spiritual Care, Psychosocial CNS, and consider a recommendation to the [facility] for a Psychiatric Consultation  Outcome: Progressing     Patient denies thoughts to harm self/others. Q15M checks continue per policy and safety maintained.

## 2024-01-05 NOTE — PLAN OF CARE
Problem: Coping  Goal: Pt/Family able to verbalize concerns and demonstrate effective coping strategies  Description: INTERVENTIONS:  1. Assist patient/family to identify coping skills, available support systems and cultural and spiritual values  2. Provide emotional support, including active listening and acknowledgement of concerns of patient and caregivers  3. Reduce environmental stimuli, as able  4. Instruct patient/family in relaxation techniques, as appropriate  5. Assess for spiritual pain/suffering and initiate Spiritual Care, Psychosocial Clinical Specialist consults as needed  1/5/2024 1005 by Tiffanie Russo RN  Outcome: Progressing     Problem: Depression/Self Harm  Goal: Effect of psychiatric condition will be minimized and patient will be protected from self harm  Description: INTERVENTIONS:  1. Assess impact of patient's symptoms on level of functioning, self care needs and offer support as indicated  2. Assess patient/family knowledge of depression, impact on illness and need for teaching  3. Provide emotional support, presence and reassurance  4. Assess for possible suicidal thoughts or ideation. If patient expresses suicidal thoughts or statements do not leave alone, initiate Suicide Precautions, move to a room close to the nursing station and obtain sitter  5. Initiate consults as appropriate with Mental Health Professional, Spiritual Care, Psychosocial CNS, and consider a recommendation to the [facility] for a Psychiatric Consultation  1/5/2024 1005 by Tiffanie Russo, RN  Outcome: Progressing    Patient is seen in the day room alert and denies any homicidal or suicidal ideation. Currently denying any depression or anxiety. Patient denies any olfactory, visual or tactile disturbances. Appearance is well kept and is in compliance with ADLs. Patient reports adequate intake and rest. Patient is medication compliant and denies any side effects. Patient is discharged focused and is seen on the phone

## 2024-01-05 NOTE — BH NOTE
Patient is being transferred to [facility] RM # [id number]. Report given to LYNDA Oseguera

## 2024-01-06 VITALS
HEIGHT: 68 IN | DIASTOLIC BLOOD PRESSURE: 80 MMHG | BODY MASS INDEX: 22.58 KG/M2 | HEART RATE: 85 BPM | OXYGEN SATURATION: 99 % | SYSTOLIC BLOOD PRESSURE: 133 MMHG | WEIGHT: 149 LBS | TEMPERATURE: 97.8 F | RESPIRATION RATE: 14 BRPM

## 2024-01-06 PROCEDURE — 99239 HOSP IP/OBS DSCHRG MGMT >30: CPT | Performed by: PSYCHIATRY & NEUROLOGY

## 2024-01-06 PROCEDURE — 6370000000 HC RX 637 (ALT 250 FOR IP): Performed by: PSYCHIATRY & NEUROLOGY

## 2024-01-06 PROCEDURE — 6370000000 HC RX 637 (ALT 250 FOR IP): Performed by: NURSE PRACTITIONER

## 2024-01-06 RX ADMIN — NICOTINE POLACRILEX 2 MG: 2 LOZENGE ORAL at 06:31

## 2024-01-06 RX ADMIN — DIVALPROEX SODIUM 500 MG: 500 TABLET, EXTENDED RELEASE ORAL at 06:31

## 2024-01-06 RX ADMIN — ARIPIPRAZOLE 15 MG: 15 TABLET ORAL at 08:31

## 2024-01-06 RX ADMIN — NICOTINE POLACRILEX 2 MG: 2 LOZENGE ORAL at 09:12

## 2024-01-06 RX ADMIN — IBUPROFEN 400 MG: 400 TABLET, FILM COATED ORAL at 09:57

## 2024-01-06 ASSESSMENT — PAIN DESCRIPTION - LOCATION: LOCATION: HEAD

## 2024-01-06 ASSESSMENT — PAIN SCALES - GENERAL
PAINLEVEL_OUTOF10: 0
PAINLEVEL_OUTOF10: 3

## 2024-01-06 NOTE — DISCHARGE SUMMARY
CHANGED    Details   hydrOXYzine HCl (ATARAX) 50 MG tablet Take 1 tablet by mouth 3 times daily as needed for AnxietyHistorical Med           STOP taking these medications       risperiDONE (RISPERDAL) 1 MG tablet Comments:   Reason for Stopping:                Core Measures statement:   Not applicable    Discharge Exam:  Level of consciousness:  Within normal limits  Appearance: Street clothes, seated, with good grooming  Behavior/Motor: No abnormalities noted  Attitude toward examiner:  Cooperative, attentive, good eye contact  Speech:  spontaneous, normal rate, normal volume and well articulated  Mood:  euthymic  Affect:  Full range  Thought processes:  linear, goal directed and coherent  Thought content:  denies homicidal ideation  Suicidal Ideation:  denies suicidal ideation  Delusions:  no evidence of delusions  Perceptual Disturbance:  denies any perceptual disturbance  Cognition:  Intact  Memory: age appropriate  Insight & Judgement: fair  Medication side effects: denies     Disposition: home    Patient Instructions:   Activity: activity as tolerated  1. Patient instructed to take medications regularly and follow up with outpatient appointments.     Follow-up as scheduled with outpatient [facility]      Signed:    Electronically signed by CARMEL COBIAN MD on 1/6/24 at 12:18 PM EST    Time Spent on discharge is more than 30 minutes in the examination, evaluation, counseling and review of medications and discharge plan.

## 2024-01-06 NOTE — DISCHARGE INSTRUCTIONS
Information:  Medications:   Medication summary provided   I understand that I should take only the medications on my list.     -why and when I need to take each medicine.     -which side effects to watch for.     -that I should carry my medication information at all times in case of     Emergency situations.    I will take all of my medicines to follow up appointments.     -check with my physician or pharmacist before taking any new    Medication, over the counter product or drink alcohol.    -Ask about food, drug or dietary supplement interactions.    -discard old lists and update records with medication providers.    Notify Physician:  Notify physician if you notice:   Always call 911 if you feel your life is in danger  In case of an emergency call 911 immediately!  If 911 is not available call your local emergency medical system for help    Behavioral Health Follow Up:  Original Referral Source:er  Discharge Diagnosis: Depression with suicidal ideation [F32.A, R45.851]  Psychosis, unspecified psychosis type (HCC) [F29]  Recommendations for Level of Care: follow up  Patient status at discharge: stable  My hospital  was: [facility]  Aftercare plan faxed: [facility]   -faxed by: writer   -date: 1/6/2024   -time: 0900  Prescriptions: escribed to own pharmacy.     Smoking: Quit Smoking.   Call the [employer]'s smoking quitline at 1-773-38O-QUIT  Know the signs of a heart attack   If you have any of the following symptoms call 911 immediately, do not wait more    Than five minutes.    1. Pressure, fullness and/ or squeezing in the center of the chest spreading to    The jaw, neck or shoulder.    2. Chest discomfort with light headedness, fainting, sweating, nausea or    Shortness of breath.   3. Upper abdominal pressure or discomfort.   4. Lower chest pain, back pain, unusual fatigue, shortness of breath, nausea   Or dizziness.     General Information:   Questions regarding your bill: Call HELP program (419)  were very sick and needed to be in the hospital, or if you have a weakened immune system: You can end isolation at the end of Day 10 or later. Talk to your doctor to find out when it's safe to end isolation. You may need a viral test.  After you end isolation, if your symptoms come back or get worse: Restart your isolation at Day 0. Do this even if it happens after you took medicine for COVID.  Avoid travel and stay away from people at high risk for serious disease for at least 10 days.  Those who can't wear a mask because they are under 2 years old or have certain disabilities should isolate for at least 10 full days.  Check the CDC website at cdc.gov for the most current information on how to protect yourself.  How can you self-isolate when you have COVID-19?  If you have COVID-19, there are things you can do to help avoid spreading the virus to others.  Stay home, and avoid contact with other people.  Limit contact with people in your home. If possible, stay in a separate bedroom and use a separate bathroom.  Wear a high-quality mask when you are around other people.  Improve airflow. If you have to spend time indoors with others, open windows and doors. Or you can use a fan to blow air away from people and out a window.  Avoid contact with pets and other animals.  Cover your mouth and nose with a tissue when you cough or sneeze. Then throw it in the trash right away.  Wash your hands often, especially after you cough or sneeze. Use soap and water, and scrub for at least 20 seconds. If soap and water aren't available, use an alcohol-based hand .  Don't share personal household items. These include bedding, towels, cups and glasses, and eating utensils.  Wash laundry in the warmest water allowed for the fabric type, and dry it completely. It's okay to wash other people's laundry with yours.  Clean and disinfect your home. Use household  and disinfectant wipes or sprays.  Go to the [facility] website at  cdc.gov if you have questions.  When should you call for help?   Call 911 anytime you think you may need emergency care. For example, call if you have life-threatening symptoms, such as:    You have severe trouble breathing. (You can't talk at all.)     You have constant chest pain or pressure.     You are severely dizzy or lightheaded.     You are confused or can't think clearly.     You have pale, gray, or blue-colored skin or lips.     You pass out (lose consciousness) or are very hard to wake up.   Call your doctor now or seek immediate medical care if:    You have moderate trouble breathing. (You can't speak a full sentence.)     You are coughing up blood.     You have signs of low blood pressure. These include feeling lightheaded; being too weak to stand; and having cold, pale, clammy skin.   Watch closely for changes in your health, and be sure to contact your doctor if:    Your symptoms get worse.     You are not getting better as expected.     You have new or worse symptoms of anxiety, depression, nightmares, or flashbacks.   If you go to the doctor's office, wear a mask.   Where can you learn more?  Go to [email or web address] and enter C008 to learn more about \"Learning About Coronavirus (COVID-19).\"  Current as of: April 28, 2023               Content Version: 13.7  © 8077-6854 [employer].   Care instructions adapted under license by [employer]. If you have questions about a medical condition or this instruction, always ask your healthcare professional. [employer] disclaims any warranty or liability for your use of this information.

## 2024-01-06 NOTE — PLAN OF CARE
Problem: Depression/Self Harm  Goal: Effect of psychiatric condition will be minimized and patient will be protected from self harm  Description: INTERVENTIONS:  1. Assess impact of patient's symptoms on level of functioning, self care needs and offer support as indicated  2. Assess patient/family knowledge of depression, impact on illness and need for teaching  3. Provide emotional support, presence and reassurance  4. Assess for possible suicidal thoughts or ideation. If patient expresses suicidal thoughts or statements do not leave alone, initiate Suicide Precautions, move to a room close to the nursing station and obtain sitter  5. Initiate consults as appropriate with Mental Health Professional, Spiritual Care, Psychosocial CNS, and consider a recommendation to the LIP for a Psychiatric Consultation  1/5/2024 2230 by Bhavya Mejia  Outcome: Progressing  Note: Patient remains free from self harm at this time. Pt denies thoughts of self harm and is agreeable to seeking out should thoughts of self harm arise.  Safe environment maintained.  Q15 minute checks for safety cont per unit policy.  Will cont to monitor for safety and provides support and reassurance as needed.       Problem: Coping  Goal: Pt/Family able to verbalize concerns and demonstrate effective coping strategies  Description: INTERVENTIONS:  1. Assist patient/family to identify coping skills, available support systems and cultural and spiritual values  2. Provide emotional support, including active listening and acknowledgement of concerns of patient and caregivers  3. Reduce environmental stimuli, as able  4. Instruct patient/family in relaxation techniques, as appropriate  5. Assess for spiritual pain/suffering and initiate Spiritual Care, Psychosocial Clinical Specialist consults as needed  1/5/2024 2230 by Bhavya Mejia  Outcome: Progressing  Note: Out in the milieu, Cooperative and in control of behavior. No scheduled night

## 2024-01-06 NOTE — GROUP NOTE
Group Therapy Note    Date: 1/5/2024    Group Start Time: 2000  Group End Time: 2030  Group Topic: Wrap-Up    Bettina De Paz        Group Therapy Note    Attendees: 10 out of 17       Patient's Goal:  to get out and be a better version on himself      Participation Level: Active Listener and Interactive    Participation Quality: Appropriate      Speech:  normal      Thought Process/Content: Logical      Affective Functioning: Congruent      Level of consciousness:  Oriented x4      Response to Learning: Able to verbalize current knowledge/experience and Able to verbalize/acknowledge new learning      Modes of Intervention: Support and Socialization      Discipline Responsible: Behavorial Health Tech      Signature:  Bettina Lewis

## 2024-01-06 NOTE — PROGRESS NOTES
[facility]al [facility] [place]  Discharge Note    Pt discharged with followings belongings:   Dental Appliances: Other (Comment) (PATIENT BELONGINGS DOUBLE BAGGED DUE TO POSSIBLE BED BUGS)  Vision - Corrective Lenses: Other (Comment)  Hearing Aid: Other (Comment)  Jewelry: Other (Comment)  Body Piercings Removed: N/A  Clothing: Other (Comment)  Other Valuables: Other (Comment)   Valuables sent home with pt or returned to patient. Patient educated on aftercare instructions: yes  Information faxed to [facility] by writer  at 11:00 AM .Patient verbalize understanding of AVS:  yes.    Status EXAM upon discharge:  Mental Status and Behavioral Exam  Normal: Yes  Level of Assistance: Independent/Self  Facial Expression: Brightened  Affect: Appropriate  Level of Consciousness: Alert  Frequency of Checks: 4 times per hour, close  Mood:Normal: Yes  Mood: Anxious  Motor Activity:Normal: Yes  Motor Activity: Increased  Eye Contact: Good  Observed Behavior: Cooperative  Sexual Misconduct History: Current - no  Preception: [place] to person, [place] to time, [place] to place, [place] to situation  Attention:Normal: Yes  Attention: Distractible  Thought Processes: Flight of ideas  Thought Content:Normal: Yes  Thought Content: Preoccupations  Depression Symptoms: No problems reported or observed.  Anxiety Symptoms: No problems reported or observed.  Jacquie Symptoms: No problems reported or observed.  Hallucinations: None  Delusions: No  Delusions: Paranoid  Memory:Normal: Yes  Memory: Poor recent  Insight and Judgment: Yes  Insight and Judgment: Poor judgment, Poor insight    Tobacco Screening:  Practical Counseling, on admission, dinora X, if applicable and completed (first 3 are required if patient doesn't refuse):            ( ) Recognizing danger situations (included triggers and roadblocks)                    ( ) Coping skills (new ways to manage stress,relaxation techniques, changing routine, distraction)

## 2024-01-06 NOTE — CARE COORDINATION
Due to pt discharging on weekend social work met with pt to confirm a phone number to contact him with a follow up appointment on Monday. Pt gave verbal for social work to call his dad at [phone] and leave the appointment with him.

## 2024-01-06 NOTE — PROGRESS NOTES
Patient given tobacco quitline number 94601592259 at this time, refusing to call at this time, states \" I just dont want to quit now\"- patient given information as to the dangers of long term tobacco use. Continue to reinforce the importance of tobacco cessation.

## 2024-01-08 NOTE — CARE COORDINATION
Name: Car Coyle    : 2005    Discharge Date: 24    Primary Auth/Cert #: [id number]     Destination: home     Discharge Medications:      Medication List        CONTINUE taking these medications      divalproex 500 MG extended release tablet  Commonly known as: DEPAKOTE ER  Take 1 tablet by mouth 2 times daily (before meals)  Notes to patient: Mood stabilizer     hydrOXYzine HCl 50 MG tablet  Commonly known as: ATARAX  Notes to patient: anxiety     traZODone 50 MG tablet  Commonly known as: DESYREL  Take 1 tablet by mouth nightly as needed for Sleep  Notes to patient: Sleep aid            STOP taking these medications      risperiDONE 1 MG tablet  Commonly known as: RISPERDAL               Where to Get Your Medications        These medications were sent to [id number] - [place], [place] - [address] - P [phone] - F [phone]  [address] [place] [address]      Phone: [phone]   divalproex 500 MG extended release tablet  traZODone 50 MG tablet         Follow Up Appointment: [facility], [facility] [address]  [facility] [address]    Follow up on 1/10/2024  You have a scheduled appointment on  at 8:00am at the [address] office with Karissa Lopes

## 2024-01-15 ENCOUNTER — HOSPITAL ENCOUNTER (INPATIENT)
Age: 19
LOS: 7 days | Discharge: HOME | DRG: 751 | End: 2024-01-22
Attending: EMERGENCY MEDICINE | Admitting: PSYCHIATRY & NEUROLOGY
Payer: COMMERCIAL

## 2024-01-15 DIAGNOSIS — F29 PSYCHOSIS, UNSPECIFIED PSYCHOSIS TYPE (HCC): Primary | ICD-10-CM

## 2024-01-15 PROBLEM — F23 ACUTE PSYCHOSIS (HCC): Status: ACTIVE | Noted: 2024-01-15

## 2024-01-15 PROCEDURE — 99285 EMERGENCY DEPT VISIT HI MDM: CPT

## 2024-01-15 PROCEDURE — 6370000000 HC RX 637 (ALT 250 FOR IP): Performed by: EMERGENCY MEDICINE

## 2024-01-15 PROCEDURE — 1240000000 HC EMOTIONAL WELLNESS R&B

## 2024-01-15 RX ORDER — LORAZEPAM 1 MG/1
2 TABLET ORAL EVERY 6 HOURS PRN
Status: DISCONTINUED | OUTPATIENT
Start: 2024-01-15 | End: 2024-01-22 | Stop reason: HOSPADM

## 2024-01-15 RX ORDER — LORAZEPAM 1 MG/1
2 TABLET ORAL ONCE
Status: DISCONTINUED | OUTPATIENT
Start: 2024-01-15 | End: 2024-01-22 | Stop reason: HOSPADM

## 2024-01-15 RX ORDER — ACETAMINOPHEN 325 MG/1
650 TABLET ORAL EVERY 4 HOURS PRN
Status: DISCONTINUED | OUTPATIENT
Start: 2024-01-15 | End: 2024-01-22 | Stop reason: HOSPADM

## 2024-01-15 RX ORDER — LORAZEPAM 1 MG/1
2 TABLET ORAL ONCE
Status: DISCONTINUED | OUTPATIENT
Start: 2024-01-15 | End: 2024-01-15

## 2024-01-15 RX ORDER — IBUPROFEN 400 MG/1
400 TABLET ORAL EVERY 6 HOURS PRN
Status: DISCONTINUED | OUTPATIENT
Start: 2024-01-15 | End: 2024-01-22 | Stop reason: HOSPADM

## 2024-01-15 RX ORDER — HALOPERIDOL 5 MG/ML
5 INJECTION INTRAMUSCULAR EVERY 6 HOURS PRN
Status: DISCONTINUED | OUTPATIENT
Start: 2024-01-15 | End: 2024-01-22 | Stop reason: HOSPADM

## 2024-01-15 RX ORDER — POLYETHYLENE GLYCOL 3350 17 G/17G
17 POWDER, FOR SOLUTION ORAL DAILY PRN
Status: DISCONTINUED | OUTPATIENT
Start: 2024-01-15 | End: 2024-01-22 | Stop reason: HOSPADM

## 2024-01-15 RX ORDER — HALOPERIDOL 5 MG/ML
5 INJECTION INTRAMUSCULAR ONCE
Status: DISCONTINUED | OUTPATIENT
Start: 2024-01-15 | End: 2024-01-15

## 2024-01-15 RX ORDER — HALOPERIDOL 5 MG/1
5 TABLET ORAL EVERY 6 HOURS PRN
Status: DISCONTINUED | OUTPATIENT
Start: 2024-01-15 | End: 2024-01-22 | Stop reason: HOSPADM

## 2024-01-15 RX ORDER — HALOPERIDOL 5 MG/1
5 TABLET ORAL ONCE
Status: COMPLETED | OUTPATIENT
Start: 2024-01-15 | End: 2024-01-15

## 2024-01-15 RX ORDER — TRAZODONE HYDROCHLORIDE 50 MG/1
50 TABLET ORAL NIGHTLY PRN
Status: DISCONTINUED | OUTPATIENT
Start: 2024-01-15 | End: 2024-01-22 | Stop reason: HOSPADM

## 2024-01-15 RX ORDER — NICOTINE 21 MG/24HR
1 PATCH, TRANSDERMAL 24 HOURS TRANSDERMAL DAILY
Status: DISCONTINUED | OUTPATIENT
Start: 2024-01-15 | End: 2024-01-19

## 2024-01-15 RX ORDER — DIPHENHYDRAMINE HYDROCHLORIDE 50 MG/ML
50 INJECTION INTRAMUSCULAR; INTRAVENOUS EVERY 6 HOURS PRN
Status: DISCONTINUED | OUTPATIENT
Start: 2024-01-15 | End: 2024-01-22 | Stop reason: HOSPADM

## 2024-01-15 RX ORDER — MAGNESIUM HYDROXIDE/ALUMINUM HYDROXICE/SIMETHICONE 120; 1200; 1200 MG/30ML; MG/30ML; MG/30ML
30 SUSPENSION ORAL EVERY 6 HOURS PRN
Status: DISCONTINUED | OUTPATIENT
Start: 2024-01-15 | End: 2024-01-22 | Stop reason: HOSPADM

## 2024-01-15 RX ORDER — HYDROXYZINE 50 MG/1
50 TABLET, FILM COATED ORAL 3 TIMES DAILY PRN
Status: DISCONTINUED | OUTPATIENT
Start: 2024-01-15 | End: 2024-01-22 | Stop reason: HOSPADM

## 2024-01-15 RX ORDER — LORAZEPAM 2 MG/ML
2 INJECTION INTRAMUSCULAR EVERY 6 HOURS PRN
Status: DISCONTINUED | OUTPATIENT
Start: 2024-01-15 | End: 2024-01-22 | Stop reason: HOSPADM

## 2024-01-15 RX ORDER — MIDAZOLAM HYDROCHLORIDE 1 MG/ML
2 INJECTION INTRAMUSCULAR; INTRAVENOUS ONCE
Status: DISCONTINUED | OUTPATIENT
Start: 2024-01-15 | End: 2024-01-15

## 2024-01-15 RX ADMIN — LORAZEPAM 2 MG: 1 TABLET ORAL at 15:11

## 2024-01-15 RX ADMIN — HALOPERIDOL 5 MG: 5 TABLET ORAL at 15:11

## 2024-01-15 ASSESSMENT — PATIENT HEALTH QUESTIONNAIRE - PHQ9
5. POOR APPETITE OR OVEREATING: 0
2. FEELING DOWN, DEPRESSED OR HOPELESS: 0
9. THOUGHTS THAT YOU WOULD BE BETTER OFF DEAD, OR OF HURTING YOURSELF: 0
SUM OF ALL RESPONSES TO PHQ QUESTIONS 1-9: 0
4. FEELING TIRED OR HAVING LITTLE ENERGY: 0
10. IF YOU CHECKED OFF ANY PROBLEMS, HOW DIFFICULT HAVE THESE PROBLEMS MADE IT FOR YOU TO DO YOUR WORK, TAKE CARE OF THINGS AT HOME, OR GET ALONG WITH OTHER PEOPLE: 0
8. MOVING OR SPEAKING SO SLOWLY THAT OTHER PEOPLE COULD HAVE NOTICED. OR THE OPPOSITE, BEING SO FIGETY OR RESTLESS THAT YOU HAVE BEEN MOVING AROUND A LOT MORE THAN USUAL: 0
1. LITTLE INTEREST OR PLEASURE IN DOING THINGS: 0
6. FEELING BAD ABOUT YOURSELF - OR THAT YOU ARE A FAILURE OR HAVE LET YOURSELF OR YOUR FAMILY DOWN: 0
3. TROUBLE FALLING OR STAYING ASLEEP: 0
SUM OF ALL RESPONSES TO PHQ QUESTIONS 1-9: 0
SUM OF ALL RESPONSES TO PHQ9 QUESTIONS 1 & 2: 0
SUM OF ALL RESPONSES TO PHQ QUESTIONS 1-9: 0
7. TROUBLE CONCENTRATING ON THINGS, SUCH AS READING THE NEWSPAPER OR WATCHING TELEVISION: 0
SUM OF ALL RESPONSES TO PHQ QUESTIONS 1-9: 0

## 2024-01-15 ASSESSMENT — PAIN - FUNCTIONAL ASSESSMENT: PAIN_FUNCTIONAL_ASSESSMENT: NONE - DENIES PAIN

## 2024-01-15 ASSESSMENT — SLEEP AND FATIGUE QUESTIONNAIRES
SLEEP PATTERN: DIFFICULTY FALLING ASLEEP;DIFFICULTY ARISING;DISTURBED/INTERRUPTED SLEEP;INSOMNIA
AVERAGE NUMBER OF SLEEP HOURS: 2
DO YOU HAVE DIFFICULTY SLEEPING: YES
DO YOU USE A SLEEP AID: NO

## 2024-01-15 ASSESSMENT — LIFESTYLE VARIABLES
HOW OFTEN DO YOU HAVE A DRINK CONTAINING ALCOHOL: NEVER
HOW OFTEN DO YOU HAVE A DRINK CONTAINING ALCOHOL: NEVER
HOW MANY STANDARD DRINKS CONTAINING ALCOHOL DO YOU HAVE ON A TYPICAL DAY: PATIENT DOES NOT DRINK
HOW MANY STANDARD DRINKS CONTAINING ALCOHOL DO YOU HAVE ON A TYPICAL DAY: PATIENT DOES NOT DRINK

## 2024-01-15 ASSESSMENT — PAIN SCALES - GENERAL: PAINLEVEL_OUTOF10: 0

## 2024-01-15 NOTE — ED NOTES
Pt pacing unit stating don't feel safe here he wont sleep for another 7 days and asking to leave. Pt states wont give blood work when asked why he said yall all ready have my blood. I am not giving any more blood and killing people and keeping covid going. Pt had blood work last drawn on 12/31/2023. Writer called [facility] charge nurse Kareen to please see if pt can be presented or if new lab work must be obtain for [facility] protocol. Rn stated she will verify and call this writer back.

## 2024-01-15 NOTE — BH NOTE
Patient arrived on the unit.  Patient was changed into a hospital gown and scanned with a metal detector before oriented to the unit.  Patient remains safe at this time. Patient is closely monitored.
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
## 2024-01-15 NOTE — ED NOTES
During pt evaluation with . Pt agreeable to admission and oral meds for anxiety. Upon leaving [facility] pt approached  stating he is psycho and becoming agitating \"I am going to hayder him\" \" I am leaving this place tonight\" Pt pacing the unit.

## 2024-01-15 NOTE — ED NOTES
[employer] in [facility] for pt watch. Safeguard informed that they need to stay with th pt at all times, must be present in the room and if they need a break or relief to let the nurse know so they can be replaced. Safeguards verbalizes understanding. Belongings and pt checked by security. Belongings locked up. Pt in blue gown.
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
## 2024-01-15 NOTE — BH NOTE
Patient given tobacco quitline number 78382839468 at this time, refusing to call at this time, states \" I just dont want to quit now\"- patient given information as to the dangers of long term tobacco use. Continue to reinforce the importance of tobacco cessation.

## 2024-01-15 NOTE — BH NOTE
[facility]al [facility] [place]  Admission Note     Admission Type:   Admission Type: Voluntary    Reason for admission:  Reason for Admission: Patient was acting delusional at furniture store. Patient has no slep in several days. Patient was disruptive and store and not making any sense when spoken too.      Addictive Behavior:   Addictive Behavior  In the Past 3 Months, Have You Felt or Has Someone Told You That You Have a Problem With  : None    Medical Problems:   History reviewed. No pertinent past medical history.    Status EXAM:  Mental Status and Behavioral Exam  Normal: No  Level of Assistance: Independent/Self  Facial Expression: Avoids Gaze  Affect: Appropriate  Level of Consciousness: Alert  Frequency of Checks: 4 times per hour, close  Mood:Normal: No  Mood: Anxious  Motor Activity:Normal: No  Motor Activity: Decreased  Eye Contact: Fair  Observed Behavior: Cooperative  Sexual Misconduct History: Current - no  Preception: [place] to person, [place] to time, [place] to place, [place] to situation  Attention:Normal: No  Attention: Unable to concentrate  Thought Processes: Other (comment) (Illogical at times)  Thought Content:Normal: No  Thought Content: Preoccupations  Depression Symptoms: No problems reported or observed.  Anxiety Symptoms: Generalized  Jacquie Symptoms: No problems reported or observed.  Hallucinations: None  Delusions: No  Memory:Normal: Yes  Insight and Judgment: No  Insight and Judgment: Poor judgment, Poor insight    Tobacco Screening:  Practical Counseling, on admission, dinora X, if applicable and completed (first 3 are required if patient doesn't refuse):            ( ) Recognizing danger situations (included triggers and roadblocks)                    ( ) Coping skills (new ways to manage stress,relaxation techniques, changing routine, distraction)                                                           ( ) Basic information about quitting (benefits of quitting, techniques in how to

## 2024-01-16 LAB
AMPHET UR QL SCN: POSITIVE
BARBITURATES UR QL SCN: NEGATIVE
BENZODIAZ UR QL: NEGATIVE
CANNABINOIDS UR QL SCN: POSITIVE
COCAINE UR QL SCN: NEGATIVE
FENTANYL UR QL: NEGATIVE
METHADONE UR QL: NEGATIVE
OPIATES UR QL SCN: NEGATIVE
OXYCODONE UR QL SCN: NEGATIVE
PCP UR QL SCN: NEGATIVE
TEST INFORMATION: ABNORMAL

## 2024-01-16 PROCEDURE — 99222 1ST HOSP IP/OBS MODERATE 55: CPT | Performed by: PSYCHIATRY & NEUROLOGY

## 2024-01-16 PROCEDURE — 6370000000 HC RX 637 (ALT 250 FOR IP): Performed by: PSYCHIATRY & NEUROLOGY

## 2024-01-16 PROCEDURE — 6370000000 HC RX 637 (ALT 250 FOR IP): Performed by: EMERGENCY MEDICINE

## 2024-01-16 PROCEDURE — 6370000000 HC RX 637 (ALT 250 FOR IP): Performed by: NURSE PRACTITIONER

## 2024-01-16 PROCEDURE — 80307 DRUG TEST PRSMV CHEM ANLYZR: CPT

## 2024-01-16 PROCEDURE — 1240000000 HC EMOTIONAL WELLNESS R&B

## 2024-01-16 PROCEDURE — 99222 1ST HOSP IP/OBS MODERATE 55: CPT | Performed by: INTERNAL MEDICINE

## 2024-01-16 PROCEDURE — 6370000000 HC RX 637 (ALT 250 FOR IP)

## 2024-01-16 PROCEDURE — APPSS60 APP SPLIT SHARED TIME 46-60 MINUTES

## 2024-01-16 RX ORDER — DIVALPROEX SODIUM 500 MG/1
500 TABLET, EXTENDED RELEASE ORAL
Status: DISCONTINUED | OUTPATIENT
Start: 2024-01-16 | End: 2024-01-22 | Stop reason: HOSPADM

## 2024-01-16 RX ORDER — ARIPIPRAZOLE 15 MG/1
15 TABLET ORAL DAILY
Status: DISCONTINUED | OUTPATIENT
Start: 2024-01-16 | End: 2024-01-18

## 2024-01-16 RX ADMIN — HYDROXYZINE HYDROCHLORIDE 50 MG: 50 TABLET, FILM COATED ORAL at 06:58

## 2024-01-16 RX ADMIN — HYDROXYZINE HYDROCHLORIDE 50 MG: 50 TABLET, FILM COATED ORAL at 20:59

## 2024-01-16 RX ADMIN — TRAZODONE HYDROCHLORIDE 50 MG: 50 TABLET ORAL at 20:59

## 2024-01-16 RX ADMIN — DIVALPROEX SODIUM 500 MG: 500 TABLET, EXTENDED RELEASE ORAL at 14:47

## 2024-01-16 RX ADMIN — ARIPIPRAZOLE 15 MG: 15 TABLET ORAL at 14:47

## 2024-01-16 ASSESSMENT — LIFESTYLE VARIABLES
HOW MANY STANDARD DRINKS CONTAINING ALCOHOL DO YOU HAVE ON A TYPICAL DAY: PATIENT DECLINED
HOW OFTEN DO YOU HAVE A DRINK CONTAINING ALCOHOL: PATIENT DECLINED

## 2024-01-16 NOTE — H&P
IN-PATIENT SERVICE  [facility] Internal Medicine    CONSULTATION / HISTORY AND PHYSICAL EXAMINATION            Date:   1/16/2024  Patient name:  Car Coyle  Date of admission:  1/15/2024  2:31 PM  MRN:   [id number]  Account:  [id number]  YOB: 2005  PCP:    Uvaldo Shaw MD  Room:   [address] [place] [address]  Code Status:    Full Code    Physician Requesting Consult: Liam Hannon,*    Reason for Consult:  medical management    Chief Complaint:     Chief Complaint   Patient presents with    Mental Health Problem       History Obtained From:     Patient medical record nursing staff    History of Present Illness:   Patient is 18-year-old male with past medical history of mental illness is been admitted at [facility] for further management of acute psychosis.    Multiple similar  admissions, borderline tachycardic hypertensive likely secondary to underlying agitation    Past Medical History:     History reviewed. No pertinent past medical history.     Past Surgical History:     Past Surgical History:   Procedure Laterality Date    ADENOIDECTOMY      TONSILLECTOMY          Medications Prior to Admission:     Prior to Admission medications    Medication Sig Start Date End Date Taking? Authorizing Provider   divalproex (DEPAKOTE ER) 500 MG extended release tablet Take 1 tablet by mouth 2 times daily (before meals) 1/6/24   Luis A Gonzalez MD   traZODone (DESYREL) 50 MG tablet Take 1 tablet by mouth nightly as needed for Sleep 1/5/24   Luis A Gonzalez MD   hydrOXYzine HCl (ATARAX) 50 MG tablet Take 1 tablet by mouth 3 times daily as needed for Anxiety    ProviderRubin MD        Allergies:     Patient has no known allergies.    Social History:     Tobacco:    reports that he has been smoking cigarettes. He has never used smokeless tobacco.  Alcohol:      reports no history of alcohol use.  Drug Use:  reports no history of drug use.    Family History:     Family History   Problem

## 2024-01-16 NOTE — GROUP NOTE
Group Therapy Note    Date: 1/16/2024    Group Start Time: 1045  Group End Time: 1120  Group Topic: Relaxation    Jois Phillip CTRS    Relaxation Group Note        Date: January 16, 2024 Start Time:  1040am   End Time:  1120am      Number of Participants in Group & Unit Census:  3/9    Topic: relaxation group     Goal of Group:pt will identify benefits of using art for coping and relaxation       Comments:     Patient did not participate in Relaxation group, despite staff encouragement and explanation of benefits.  Patient remain seclusive to self.  Q15 minute safety checks maintained for patient safety and will continue to encourage patient to attend unit programming.            Signature:  THU MARINO
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
## 2024-01-17 LAB
BASOPHILS # BLD: 0.1 K/UL (ref 0–0.2)
BASOPHILS NFR BLD: 1 % (ref 0–2)
EOSINOPHIL # BLD: 0.3 K/UL (ref 0–0.4)
EOSINOPHILS RELATIVE PERCENT: 4 % (ref 0–4)
ERYTHROCYTE [DISTWIDTH] IN BLOOD BY AUTOMATED COUNT: 13.2 % (ref 11.5–14.9)
HCT VFR BLD AUTO: 47.1 % (ref 41–53)
HGB BLD-MCNC: 16.1 G/DL (ref 13.5–17.5)
LYMPHOCYTES NFR BLD: 2 K/UL (ref 1.2–5.2)
LYMPHOCYTES RELATIVE PERCENT: 26 % (ref 25–45)
MCH RBC QN AUTO: 30.2 PG (ref 26–34)
MCHC RBC AUTO-ENTMCNC: 34.3 G/DL (ref 31–37)
MCV RBC AUTO: 88.2 FL (ref 80–100)
MONOCYTES NFR BLD: 0.5 K/UL (ref 0.1–1.3)
MONOCYTES NFR BLD: 6 % (ref 2–8)
NEUTROPHILS NFR BLD: 63 % (ref 34–64)
NEUTS SEG NFR BLD: 5.1 K/UL (ref 1.3–9.1)
PLATELET # BLD AUTO: 191 K/UL (ref 150–450)
PMV BLD AUTO: 8.3 FL (ref 6–12)
RBC # BLD AUTO: 5.34 M/UL (ref 4.5–5.9)
WBC OTHER # BLD: 7.9 K/UL (ref 4.5–13.5)

## 2024-01-17 PROCEDURE — 85025 COMPLETE CBC W/AUTO DIFF WBC: CPT

## 2024-01-17 PROCEDURE — 6370000000 HC RX 637 (ALT 250 FOR IP): Performed by: PSYCHIATRY & NEUROLOGY

## 2024-01-17 PROCEDURE — 6370000000 HC RX 637 (ALT 250 FOR IP): Performed by: NURSE PRACTITIONER

## 2024-01-17 PROCEDURE — 1240000000 HC EMOTIONAL WELLNESS R&B

## 2024-01-17 PROCEDURE — 6360000002 HC RX W HCPCS: Performed by: NURSE PRACTITIONER

## 2024-01-17 PROCEDURE — 99232 SBSQ HOSP IP/OBS MODERATE 35: CPT | Performed by: INTERNAL MEDICINE

## 2024-01-17 PROCEDURE — 6370000000 HC RX 637 (ALT 250 FOR IP): Performed by: EMERGENCY MEDICINE

## 2024-01-17 PROCEDURE — 6370000000 HC RX 637 (ALT 250 FOR IP)

## 2024-01-17 PROCEDURE — APPSS30 APP SPLIT SHARED TIME 16-30 MINUTES

## 2024-01-17 PROCEDURE — 99232 SBSQ HOSP IP/OBS MODERATE 35: CPT | Performed by: PSYCHIATRY & NEUROLOGY

## 2024-01-17 PROCEDURE — 36415 COLL VENOUS BLD VENIPUNCTURE: CPT

## 2024-01-17 RX ADMIN — HYDROXYZINE HYDROCHLORIDE 50 MG: 50 TABLET, FILM COATED ORAL at 06:29

## 2024-01-17 RX ADMIN — DIVALPROEX SODIUM 500 MG: 500 TABLET, EXTENDED RELEASE ORAL at 09:00

## 2024-01-17 RX ADMIN — ARIPIPRAZOLE 15 MG: 15 TABLET ORAL at 09:00

## 2024-01-17 RX ADMIN — HALOPERIDOL LACTATE 5 MG: 5 INJECTION, SOLUTION INTRAMUSCULAR at 16:06

## 2024-01-17 RX ADMIN — DIPHENHYDRAMINE HYDROCHLORIDE 50 MG: 50 INJECTION INTRAMUSCULAR; INTRAVENOUS at 16:07

## 2024-01-17 RX ADMIN — LORAZEPAM 2 MG: 2 INJECTION INTRAMUSCULAR; INTRAVENOUS at 16:06

## 2024-01-17 ASSESSMENT — PAIN SCALES - GENERAL: PAINLEVEL_OUTOF10: 0

## 2024-01-17 ASSESSMENT — LIFESTYLE VARIABLES
HOW OFTEN DO YOU HAVE A DRINK CONTAINING ALCOHOL: PATIENT DECLINED
HOW MANY STANDARD DRINKS CONTAINING ALCOHOL DO YOU HAVE ON A TYPICAL DAY: PATIENT DECLINED

## 2024-01-17 NOTE — BH NOTE
Emergency Medication Follow-Up Note:    PRN medication of Ativan 2mg IM, Haldol 5mg IM and benadryl 50mg IM. Medication was effective as evidence by Patient regain behavioral control and resting in room. Patient denies medication side effects. Will continue to monitor and provide support as needed.

## 2024-01-17 NOTE — GROUP NOTE
Group Therapy Note    Date: 1/17/2024    Group Start Time: 1500  Group End Time: 1615  Group Topic: Art Therapy     Gilma Akhtar LPN        Group Therapy Note    Attendees: 1/8       Notes:  Patient declined to attend group therapy. Will continue to encourage unit programming.       Signature:  Gilma Horner LPN

## 2024-01-17 NOTE — BH NOTE
Emergency PRN Medication Administration Note:      Patient is Agitated and Disruptive as evidence by yelling at staff, slamming doors and throwing trays and paper. Staff attempted to find and relieve the distress by Talking to patient, offering alternate activities and assessing for undiagnosed pain.  Patient is currently   continuing to escalate and accepted PRN medications. Medication Administered as prescribed: Ativan 2mg IM, Haldol 5mg IM and benadryl 50mg IM.  Patient Tolerated medication administration.   Will continue to monitor, offer support, and reassess.

## 2024-01-17 NOTE — GROUP NOTE
Psych education Group Note        Date: January 17, 2024 Start Time: 10: 45 am  End Time: 11:25 am      Number of Participants in Group & Unit Census:  2/9    Topic: Coping skills, leisure, Self-awareness    Goal of Group:To increase self-awareness through identifying coping skills, leisure interests, support and ways to improve overall health. To increase interpersonal communication through interacting with others.      Comments:     Patient did not participate in Psych Education group, despite staff encouragement and explanation of benefits.  Patient remain seclusive to self.  Q15 minute safety checks maintained for patient safety and will continue to encourage patient to attend unit programming.         Signature:  Heidi Fritsch, CTRS

## 2024-01-18 PROCEDURE — 6370000000 HC RX 637 (ALT 250 FOR IP)

## 2024-01-18 PROCEDURE — 1240000000 HC EMOTIONAL WELLNESS R&B

## 2024-01-18 PROCEDURE — APPSS30 APP SPLIT SHARED TIME 16-30 MINUTES

## 2024-01-18 PROCEDURE — 99232 SBSQ HOSP IP/OBS MODERATE 35: CPT | Performed by: PSYCHIATRY & NEUROLOGY

## 2024-01-18 PROCEDURE — 99232 SBSQ HOSP IP/OBS MODERATE 35: CPT | Performed by: INTERNAL MEDICINE

## 2024-01-18 PROCEDURE — 6370000000 HC RX 637 (ALT 250 FOR IP): Performed by: PSYCHIATRY & NEUROLOGY

## 2024-01-18 RX ORDER — ARIPIPRAZOLE 20 MG/1
20 TABLET ORAL DAILY
Status: DISCONTINUED | OUTPATIENT
Start: 2024-01-19 | End: 2024-01-22 | Stop reason: HOSPADM

## 2024-01-18 RX ADMIN — DIVALPROEX SODIUM 500 MG: 500 TABLET, EXTENDED RELEASE ORAL at 06:31

## 2024-01-18 RX ADMIN — DIVALPROEX SODIUM 500 MG: 500 TABLET, EXTENDED RELEASE ORAL at 17:04

## 2024-01-18 RX ADMIN — ARIPIPRAZOLE 15 MG: 15 TABLET ORAL at 07:52

## 2024-01-18 NOTE — GROUP NOTE
Group Therapy Note    Date: 1/18/2024    Group Start Time: 1100  Group End Time: 1145  Group Topic: Cognitive Skills    Josi Phillip CTRS    Cognitive Skills Group Note        Date: January 18, 2024 Start Time: 11am  End Time: 11:45am      Number of Participants in Group & Unit Census:  3/8    Topic: cognitive skills     Goal of Group: pt will demonstrate improved self awareness and improved coping skills       Comments:     Patient did not participate in Cognitive Skills group, despite staff encouragement and explanation of benefits.  Patient remain seclusive to self.  Q15 minute safety checks maintained for patient safety and will continue to encourage patient to attend unit programming.              Signature:  THU MARINO

## 2024-01-18 NOTE — GROUP NOTE
Group Therapy Note    Date: 1/18/2024    Group Start Time: 1000  Group End Time: 1030  Group Topic: Psychoeducation    Ivonne Lezama MSW, ANDREZ        Group Therapy Note    Attendees: 3/9       Patient was offered group therapy today but declined to participate despite encouragement from staff.  1:1 was offered.       Signature:  RAUL Locke, ANDREZ

## 2024-01-18 NOTE — GROUP NOTE
Group Therapy Note    Date: 1/18/2024    Group Start Time: 1330  Group End Time: 1350  Group Topic: Recreational    [facility] Josi Dick CTRS      Recreation Group Note        Date: January 18, 2023 Start Time: 1:30pm  End Time:  150pm      Number of Participants in Group & Unit Census:  2/7    Topic: recreation group     Goal of Group: pt will demonstrate improved social skills and improved interpersonal relationships       Comments:     Patient did not participate in Recreation group, despite staff encouragement and explanation of benefits.  Patient remain seclusive to self.  Q15 minute safety checks maintained for patient safety and will continue to encourage patient to attend unit programming.            Signature:  THU MARINO

## 2024-01-18 NOTE — GROUP NOTE
Group Therapy Note    Date: 1/18/2024    Group Start Time: 0900  Group End Time: 0915  Group Topic: Community Meeting    Ana Rosa Patel      Group Therapy Note    Attendees: 4/9     Patient's Goal: Patient will verbalize today's goals. Patient will also offer                            supportive listening and interact with peers to clarify and                            understand clearly set goals.       Notes: Patient is making progress AEB participating in group discussion, actively               listening, and supporting other group members.             Status After Intervention: Improved      Participation Level: Active Listener and Interactive      Participation Quality: Appropriate, Attentive, Sharing, and Supportive      Speech: normal      Thought Process/Content: Logical, Linear      Affective Functioning: Congruent      Mood: anxious      Level of consciousness: Oriented x4      Response to Learning: Able to verbalize current knowledge/experience, Able to                                         verbalize/acknowledge new learning, Able to retain                                         information, and Capable of insight       Endings: None Reported      Modes of Intervention: Education, Support, Socialization, and Problem-solving         Discipline Responsible: Behavorial Health Tech      Signature: Ana Rosa Cochran

## 2024-01-19 PROCEDURE — 6370000000 HC RX 637 (ALT 250 FOR IP)

## 2024-01-19 PROCEDURE — APPSS30 APP SPLIT SHARED TIME 16-30 MINUTES

## 2024-01-19 PROCEDURE — 6370000000 HC RX 637 (ALT 250 FOR IP): Performed by: PSYCHIATRY & NEUROLOGY

## 2024-01-19 PROCEDURE — 6370000000 HC RX 637 (ALT 250 FOR IP): Performed by: EMERGENCY MEDICINE

## 2024-01-19 PROCEDURE — 1240000000 HC EMOTIONAL WELLNESS R&B

## 2024-01-19 PROCEDURE — 99232 SBSQ HOSP IP/OBS MODERATE 35: CPT | Performed by: PSYCHIATRY & NEUROLOGY

## 2024-01-19 RX ORDER — POLYETHYLENE GLYCOL 3350 17 G
2 POWDER IN PACKET (EA) ORAL
Status: DISCONTINUED | OUTPATIENT
Start: 2024-01-19 | End: 2024-01-22 | Stop reason: HOSPADM

## 2024-01-19 RX ORDER — ONDANSETRON 4 MG/1
4 TABLET, ORALLY DISINTEGRATING ORAL EVERY 6 HOURS PRN
Status: DISCONTINUED | OUTPATIENT
Start: 2024-01-19 | End: 2024-01-22 | Stop reason: HOSPADM

## 2024-01-19 RX ADMIN — NICOTINE POLACRILEX 2 MG: 2 LOZENGE ORAL at 14:50

## 2024-01-19 RX ADMIN — ARIPIPRAZOLE 20 MG: 20 TABLET ORAL at 12:36

## 2024-01-19 RX ADMIN — DIVALPROEX SODIUM 500 MG: 500 TABLET, EXTENDED RELEASE ORAL at 08:26

## 2024-01-19 RX ADMIN — NICOTINE POLACRILEX 2 MG: 2 LOZENGE ORAL at 12:36

## 2024-01-19 RX ADMIN — NICOTINE POLACRILEX 2 MG: 2 LOZENGE ORAL at 20:01

## 2024-01-19 RX ADMIN — NICOTINE POLACRILEX 2 MG: 2 LOZENGE ORAL at 16:33

## 2024-01-19 RX ADMIN — DIVALPROEX SODIUM 500 MG: 500 TABLET, EXTENDED RELEASE ORAL at 16:34

## 2024-01-19 NOTE — GROUP NOTE
Group Therapy Note    Date: 1/19/2024    Group Start Time: 1330  Group End Time: 1400  Group Topic: Recreational    [facility] Josi Dick CTRS    Recreation Group Note        Date: January 19, 2024 Start Time: 1:30pm  End Time:  200pm      Number of Participants in Group & Unit Census:  2/8    Topic: recreation group     Goal of Group: pt will demonstrate improved coping skills and improved social skills      Comments:     Patient did not participate in Recreation group, despite staff encouragement and explanation of benefits.  Patient remain seclusive to self.  Q15 minute safety checks maintained for patient safety and will continue to encourage patient to attend unit programming.              Signature:  THU MARINO

## 2024-01-19 NOTE — GROUP NOTE
Group Therapy Note    Date: 1/19/2024    Group Start Time: 1000  Group End Time: 1030  Group Topic: Psychoeducation    [facility] Ivonne Fleming MSW, ANDREZ        Group Therapy Note    Attendees: 3/8       Patient's Goal:  Expression of feeling    Notes:      Status After Intervention:  Improved    Participation Level: Interactive    Participation Quality: Sharing      Speech:  normal      Thought Process/Content: Logical      Affective Functioning: Congruent      Mood: euthymic      Level of consciousness:  Alert and Oriented x4      Response to Learning: Progressing to goal      Endings: None Reported    Modes of Intervention: Education      Discipline Responsible: /Counselor      Signature:  RAUL Locke, ANDREZ

## 2024-01-19 NOTE — GROUP NOTE
Group Therapy Note    Date: 1/19/2024    Group Start Time: 1100  Group End Time: 1130  Group Topic: Cognitive Skills    Josi Phillip CTRS        Group Therapy Note    Attendees: 4/8       Patient's Goal:  pt will demonstrate improved communication skills and improved coping skills     Notes: pt had difficulty remaining on task and topic. Pt got from group several times and came back to group . Pt was responding to internal stimuli.       Status After Intervention:  Improved    Participation Level: appropriate when in group     Participation Quality: Appropriate \but anxious       Speech:  normal      Thought Process/Content: disorganized , responding       Affective Functioning: Congruent      Mood: anxious      Level of consciousness:  Alert      Response to Learning: Able to verbalize current knowledge/experience, Capable of insight, and Progressing to goal      Endings: None Reported    Modes of Intervention: Education, Support, and Activity      Discipline Responsible: Psychoeducational Specialist      Signature:  THU MARINO

## 2024-01-20 PROCEDURE — 6370000000 HC RX 637 (ALT 250 FOR IP)

## 2024-01-20 PROCEDURE — 99233 SBSQ HOSP IP/OBS HIGH 50: CPT | Performed by: NURSE PRACTITIONER

## 2024-01-20 PROCEDURE — 6370000000 HC RX 637 (ALT 250 FOR IP): Performed by: NURSE PRACTITIONER

## 2024-01-20 PROCEDURE — 6370000000 HC RX 637 (ALT 250 FOR IP): Performed by: PSYCHIATRY & NEUROLOGY

## 2024-01-20 PROCEDURE — 1240000000 HC EMOTIONAL WELLNESS R&B

## 2024-01-20 RX ADMIN — NICOTINE POLACRILEX 2 MG: 2 LOZENGE ORAL at 06:58

## 2024-01-20 RX ADMIN — NICOTINE POLACRILEX 2 MG: 2 LOZENGE ORAL at 08:14

## 2024-01-20 RX ADMIN — NICOTINE POLACRILEX 2 MG: 2 LOZENGE ORAL at 14:26

## 2024-01-20 RX ADMIN — TRAZODONE HYDROCHLORIDE 50 MG: 50 TABLET ORAL at 02:19

## 2024-01-20 RX ADMIN — ARIPIPRAZOLE 20 MG: 20 TABLET ORAL at 08:14

## 2024-01-20 RX ADMIN — ACETAMINOPHEN 650 MG: 325 TABLET ORAL at 21:04

## 2024-01-20 RX ADMIN — DIVALPROEX SODIUM 500 MG: 500 TABLET, EXTENDED RELEASE ORAL at 08:14

## 2024-01-20 RX ADMIN — HYDROXYZINE HYDROCHLORIDE 50 MG: 50 TABLET, FILM COATED ORAL at 17:44

## 2024-01-20 RX ADMIN — NICOTINE POLACRILEX 2 MG: 2 LOZENGE ORAL at 17:45

## 2024-01-20 RX ADMIN — DIVALPROEX SODIUM 500 MG: 500 TABLET, EXTENDED RELEASE ORAL at 16:07

## 2024-01-20 RX ADMIN — NICOTINE POLACRILEX 2 MG: 2 LOZENGE ORAL at 20:24

## 2024-01-20 RX ADMIN — TRAZODONE HYDROCHLORIDE 50 MG: 50 TABLET ORAL at 21:04

## 2024-01-20 RX ADMIN — NICOTINE POLACRILEX 2 MG: 2 LOZENGE ORAL at 10:37

## 2024-01-20 RX ADMIN — HYDROXYZINE HYDROCHLORIDE 50 MG: 50 TABLET, FILM COATED ORAL at 02:19

## 2024-01-20 RX ADMIN — NICOTINE POLACRILEX 2 MG: 2 LOZENGE ORAL at 16:07

## 2024-01-20 ASSESSMENT — PAIN DESCRIPTION - LOCATION: LOCATION: BACK

## 2024-01-20 ASSESSMENT — PAIN SCALES - GENERAL: PAINLEVEL_OUTOF10: 3

## 2024-01-20 NOTE — GROUP NOTE
Group Therapy Note    Date: 1/20/2024    Group Start Time: 0900  Group End Time: 0915  Group Topic: Community Meeting    Kimberly Palmer        Group Therapy Note    Attendees: 3/9       Patient's Goal:  discharge planning     Status After Intervention:  Improved    Participation Level: Active Listener and Interactive    Participation Quality: Appropriate, Attentive, and Sharing      Speech:  normal      Thought Process/Content: Logical      Affective Functioning: Congruent      Mood: euthymic      Level of consciousness:  Alert and Oriented x4      Response to Learning: Able to verbalize current knowledge/experience and Able to verbalize/acknowledge new learning      Endings: None Reported    Modes of Intervention: Education and Support      Discipline Responsible: Behavorial Health Tech      Signature:  Kimberly Sandoval

## 2024-01-20 NOTE — BH NOTE
Patient transferred to [facility] with chart and belongings.  Patient escorted by 2 staff.

## 2024-01-21 PROCEDURE — 1240000000 HC EMOTIONAL WELLNESS R&B

## 2024-01-21 PROCEDURE — 6370000000 HC RX 637 (ALT 250 FOR IP): Performed by: PSYCHIATRY & NEUROLOGY

## 2024-01-21 PROCEDURE — 6370000000 HC RX 637 (ALT 250 FOR IP)

## 2024-01-21 PROCEDURE — 6370000000 HC RX 637 (ALT 250 FOR IP): Performed by: NURSE PRACTITIONER

## 2024-01-21 PROCEDURE — 99232 SBSQ HOSP IP/OBS MODERATE 35: CPT | Performed by: NURSE PRACTITIONER

## 2024-01-21 RX ADMIN — ACETAMINOPHEN 650 MG: 325 TABLET ORAL at 04:33

## 2024-01-21 RX ADMIN — HYDROXYZINE HYDROCHLORIDE 50 MG: 50 TABLET, FILM COATED ORAL at 21:16

## 2024-01-21 RX ADMIN — DIVALPROEX SODIUM 500 MG: 500 TABLET, EXTENDED RELEASE ORAL at 17:25

## 2024-01-21 RX ADMIN — NICOTINE POLACRILEX 2 MG: 2 LOZENGE ORAL at 10:31

## 2024-01-21 RX ADMIN — NICOTINE POLACRILEX 2 MG: 2 LOZENGE ORAL at 06:08

## 2024-01-21 RX ADMIN — NICOTINE POLACRILEX 2 MG: 2 LOZENGE ORAL at 17:22

## 2024-01-21 RX ADMIN — IBUPROFEN 400 MG: 400 TABLET, FILM COATED ORAL at 14:16

## 2024-01-21 RX ADMIN — ACETAMINOPHEN 650 MG: 325 TABLET ORAL at 21:16

## 2024-01-21 RX ADMIN — ACETAMINOPHEN 650 MG: 325 TABLET ORAL at 15:21

## 2024-01-21 RX ADMIN — ARIPIPRAZOLE 20 MG: 20 TABLET ORAL at 10:31

## 2024-01-21 RX ADMIN — NICOTINE POLACRILEX 2 MG: 2 LOZENGE ORAL at 15:21

## 2024-01-21 RX ADMIN — HYDROXYZINE HYDROCHLORIDE 50 MG: 50 TABLET, FILM COATED ORAL at 14:18

## 2024-01-21 RX ADMIN — TRAZODONE HYDROCHLORIDE 50 MG: 50 TABLET ORAL at 21:16

## 2024-01-21 RX ADMIN — NICOTINE POLACRILEX 2 MG: 2 LOZENGE ORAL at 18:39

## 2024-01-21 RX ADMIN — NICOTINE POLACRILEX 2 MG: 2 LOZENGE ORAL at 12:28

## 2024-01-21 RX ADMIN — DIVALPROEX SODIUM 500 MG: 500 TABLET, EXTENDED RELEASE ORAL at 06:06

## 2024-01-21 RX ADMIN — NICOTINE POLACRILEX 2 MG: 2 LOZENGE ORAL at 13:34

## 2024-01-21 ASSESSMENT — PAIN SCALES - GENERAL
PAINLEVEL_OUTOF10: 4
PAINLEVEL_OUTOF10: 5
PAINLEVEL_OUTOF10: 6
PAINLEVEL_OUTOF10: 3

## 2024-01-21 ASSESSMENT — PAIN DESCRIPTION - DESCRIPTORS: DESCRIPTORS: ACHING;DISCOMFORT

## 2024-01-21 ASSESSMENT — PAIN DESCRIPTION - LOCATION
LOCATION: BACK
LOCATION: HEAD

## 2024-01-21 ASSESSMENT — PAIN - FUNCTIONAL ASSESSMENT
PAIN_FUNCTIONAL_ASSESSMENT: ACTIVITIES ARE NOT PREVENTED

## 2024-01-21 NOTE — GROUP NOTE
Group Therapy Note    Date: 1/21/2024    Group Start Time: 1000  Group End Time: 1030  Group Topic: Psychoeducation    [facility]    Casi Hernandez MSW, LSW        Group Therapy Note    Attendees: 4/17       Patient's Goal:  Increase interpersonal relationship skills with open discussion utilizing mental health prompts.      Status After Intervention:  Unchanged    Participation Level: Active Listener    Participation Quality: Appropriate      Speech:  normal      Thought Process/Content: Logical      Affective Functioning: Congruent      Mood: angry      Level of consciousness:  Attentive      Response to Learning: Able to verbalize current knowledge/experience      Endings: None Reported    Modes of Intervention: Education, Support, and Socialization      Discipline Responsible: /Counselor      Signature:  RAUL Graff LSW     Pt A/O x4, VSS afebrile. Pain managed well with PRN [place]. Midline incision with staples intact. Ice and ABD binder for comfort. Regular diet, but poor appetite- encourage small snacks along with supplements. Antiemetic available for intermittent nausea. Up IND in room to BR, no stools this shift. Good UOP. IV SL. CT of pelvic drain demonstrates fluid abscess collection decreased but still present. Drain flush orders Q shift. Pt demonstrates appropriate technique Lovenox injection. WBC slightly elevated, + BC, on oral ABX.  Plan: Discharge home 1-2 days

## 2024-01-21 NOTE — GROUP NOTE
Group Therapy Note    Date: 1/21/2024    Group Start Time: 1100  Group End Time: 1130  Group Topic: Psychoeducation    [facility]    Sophie Milton CTRS    Group Therapy Note    Attendees: 9/17     Patient's Goal:  Patient will identify benefits of leisure for coping and social support    Notes:  Patient attended group and participated    Status After Intervention:  Improved    Participation Level: Active Listener and Interactive    Participation Quality: Appropriate, Attentive, Sharing, and Supportive      Speech:  normal      Thought Process/Content: Flight of ideas      Affective Functioning: Constricted/Restricted      Mood: euthymic      Level of consciousness:  Alert and Attentive      Response to Learning: Able to verbalize current knowledge/experience, Able to verbalize/acknowledge new learning, and Progressing to goal      Endings: None Reported    Modes of Intervention: Education, Support, Socialization, and Exploration      Discipline Responsible: Psychoeducational Specialist      Signature:  THU Lindsey

## 2024-01-22 VITALS
RESPIRATION RATE: 14 BRPM | WEIGHT: 149 LBS | HEIGHT: 68 IN | BODY MASS INDEX: 22.58 KG/M2 | DIASTOLIC BLOOD PRESSURE: 68 MMHG | HEART RATE: 96 BPM | OXYGEN SATURATION: 100 % | SYSTOLIC BLOOD PRESSURE: 113 MMHG | TEMPERATURE: 97.1 F

## 2024-01-22 PROCEDURE — 6370000000 HC RX 637 (ALT 250 FOR IP)

## 2024-01-22 PROCEDURE — 6370000000 HC RX 637 (ALT 250 FOR IP): Performed by: PSYCHIATRY & NEUROLOGY

## 2024-01-22 PROCEDURE — 99239 HOSP IP/OBS DSCHRG MGMT >30: CPT | Performed by: PSYCHIATRY & NEUROLOGY

## 2024-01-22 RX ORDER — ARIPIPRAZOLE 20 MG/1
20 TABLET ORAL DAILY
Qty: 30 TABLET | Refills: 0 | Status: SHIPPED | OUTPATIENT
Start: 2024-01-22

## 2024-01-22 RX ORDER — DIVALPROEX SODIUM 500 MG/1
500 TABLET, EXTENDED RELEASE ORAL
Qty: 60 TABLET | Refills: 0 | Status: SHIPPED | OUTPATIENT
Start: 2024-01-22

## 2024-01-22 RX ORDER — TRAZODONE HYDROCHLORIDE 50 MG/1
50 TABLET ORAL NIGHTLY PRN
Qty: 30 TABLET | Refills: 0 | Status: SHIPPED | OUTPATIENT
Start: 2024-01-22

## 2024-01-22 RX ADMIN — NICOTINE POLACRILEX 2 MG: 2 LOZENGE ORAL at 09:38

## 2024-01-22 RX ADMIN — ARIPIPRAZOLE 20 MG: 20 TABLET ORAL at 08:11

## 2024-01-22 RX ADMIN — DIVALPROEX SODIUM 500 MG: 500 TABLET, EXTENDED RELEASE ORAL at 05:53

## 2024-01-22 RX ADMIN — NICOTINE POLACRILEX 2 MG: 2 LOZENGE ORAL at 08:11

## 2024-01-22 NOTE — CARE COORDINATION
Name: Car Coyle    : 2005    Auth number: [id number]     Discharge Date: 24    Destination: home     Discharge Medications:      Medication List        START taking these medications      ARIPiprazole 20 MG tablet  Commonly known as: ABILIFY  Take 1 tablet by mouth daily  Notes to patient: Clear thoughts            CONTINUE taking these medications      divalproex 500 MG extended release tablet  Commonly known as: DEPAKOTE ER  Take 1 tablet by mouth 2 times daily (before meals)  Notes to patient: mood     hydrOXYzine HCl 50 MG tablet  Commonly known as: ATARAX  Notes to patient: anxiety     traZODone 50 MG tablet  Commonly known as: DESYREL  Take 1 tablet by mouth nightly as needed for Sleep  Notes to patient: sleep               Where to Get Your Medications        These medications were sent to [id number] - [address] -  [phone] - F [phone]  [address] [place] [id number]      Phone: [phone]   ARIPiprazole 20 MG tablet  divalproex 500 MG extended release tablet  traZODone 50 MG tablet         Follow Up Appointment: [facility] [address]  [address]  [facility] [address]    Go on 2024  At 9:30AM     
marijuana and amphetamines. SW to provide support and assist with discharge planning.
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
## 2024-01-22 NOTE — TRANSITION OF CARE
Discharge Plan:   Is the patient a tobacco user  and needs referral for tobacco cessation? Yes  Patient referred to the following for tobacco cessation with an appointment? Patient refused  Patient was offered medication to assist with tobacco cessation at discharge? Patient refused    Alcohol/Substance Abuse Discharge Plan:   Does the patient have a history of substance/alcohol abuse and requires a referral for treatment? No  Patient referred to the following for substance/alcohol abuse treatment with an appointment? Patient refused  Patient was offered medication to assist with alcohol cessation at discharge? Patient refused      Patient discharged to Home; discussed with patient/caregiver

## 2024-01-22 NOTE — BH NOTE
[facility]  Discharge Note    Pt discharged with followings belongings:   Dental Appliances: None  Vision - Corrective Lenses: None  Hearing Aid: None  Jewelry: None  Body Piercings Removed: N/A  Clothing: Footwear, Gloves, Jacket/Coat, Pants, Shirt, Socks  Other Valuables: Other (Comment) (1 Key)   Valuables sent home with patient or returned to patient. Patient educated on aftercare instructions: yes  Information faxed to [facility] by writer  at 11:23 AM .Patient verbalize understanding of AVS:  yes.    Status EXAM upon discharge:  Mental Status and Behavioral Exam  Normal: Yes  Level of Assistance: Independent/Self  Facial Expression: Brightened  Affect: Appropriate  Level of Consciousness: Alert  Frequency of Checks: 4 times per hour, close  Mood:Normal: No  Mood: Anxious  Motor Activity:Normal: Yes  Motor Activity: Decreased  Eye Contact: Good  Observed Behavior: Cooperative, Friendly, Preoccupied  Sexual Misconduct History: Current - no  Preception: [place] to person, [place] to time, [place] to place, [place] to situation  Attention:Normal: No  Attention: Distractible  Thought Processes: Unremarkable  Thought Content:Normal: No  Thought Content: Preoccupations  Depression Symptoms: Impaired concentration  Anxiety Symptoms: Generalized  Jacquie Symptoms: No problems reported or observed.  Hallucinations: None  Delusions: No  Delusions: Paranoid  Memory:Normal: Yes  Memory: Confabulation, Poor recent  Insight and Judgment: No  Insight and Judgment: Poor insight    Tobacco Screening:  Practical Counseling, on admission, dinora X, if applicable and completed (first 3 are required if patient doesn't refuse):            ( ) Recognizing danger situations (included triggers and roadblocks)                    ( ) Coping skills (new ways to manage stress,relaxation techniques, changing routine, distraction)                                                           ( ) Basic information about quitting (benefits of

## 2024-01-22 NOTE — DISCHARGE INSTRUCTIONS
Line)  [phone]      Recovery Help line- [phone]      To obtain results of pending studies call Medical Records at: [phone]     For emergencies and 24 hour/7 days a week contact information:  [phone]

## 2024-01-22 NOTE — PROGRESS NOTES
Group Therapy Note    Date: 1/20/2024    Group Start Time: 1400  Group End Time: 1445  Group Topic: Psychoeducation    [facility]    Soph[facility]    Group Therapy Note    Attendees: 3/8       Patient's Goal:  Patient will demonstrate improved interpersonal skills    Notes:  Patient attended group and participated    Status After Intervention:  Improved    Participation Level: Active Listener and Interactive    Participation Quality: Appropriate, Attentive, Sharing    Speech:  normal      Thought Process/Content: Logical  Linear      Affective Functioning: Congruent      Mood: euthymic      Level of consciousness:  Alert, Oriented x4, and Attentive      Response to Learning: Able to verbalize current knowledge/experience, Able to verbalize/acknowledge new learning      Endings: None Reported    Modes of Intervention: Education, Support, Socialization, and Exploration      Discipline Responsible: Psychoeducational Specialist      Signature:  THU Lindsey  
      Behavioral Services  Medicare Certification Upon Admission    I certify that this patient's inpatient psychiatric hospital admission is medically necessary for:    [x] (1) Treatment which could reasonably be expected to improve this patient's condition,       [x] (2) Or for diagnostic study;     AND     [x](2) The inpatient psychiatric services are provided while the individual is under the care of a physician and are included in the individualized plan of care.    Estimated length of stay/service 3-5 days    Plan for post-hospital care hc    Electronically signed by Liam Hannon MD on 1/16/2024 at 7:41 AM      
  Daily Progress Note  1/18/2024    Patient Name: Car Coyle    CHIEF COMPLAINT: Acute psychosis         SUBJECTIVE:    Car was found in his room.  Covers up over his head and he refused to engage in conversation and stated \"I want to be left alone.\"  Patient is seen today for a follow up assessment.  Per nursing staff he continues to deny everything but continues to exhibit significant delusions.  He frequently gets up and paces the unit and is having difficulty with sleep.  Yesterday he was given IM injections due to attempting to vomit after getting oral medications.  He was also yelling at staff, slamming doors, and throwing trays.  He continues to be labile and impulsive.  He continues to show poor insight but has been taking his Abilify for the past 3 days.  Discussed with the attending and plan is to increase Abilify to 20 mg daily.  At this time he continues to need inpatient psychiatric stabilization and is unsafe to be discharged due to his current psychosis.      Appetite:  [x] Adequate/Unchanged  [] Increased  [] Decreased      Sleep:       [] Adequate/Unchanged  [] Fair  [x] Poor      Group Attendance on Unit:   [] Yes   [] Selectively    [x] No    Compliant with scheduled medications: [x] Yes  [] No    Received emergency medications in past 24 hrs: [x] Yes   [] No    Medication Side Effects: Denies         Mental Status Exam  Level of consciousness: Lethargic  Appearance: Appropriate attire for setting, resting in bed, with fair  grooming and hygiene   Behavior/Motor: Unapproachable, no psychomotor abnormalities   Attitude toward examiner: Uncooperative  Speech: slow and monotone   Mood: \"I want to be left alone.\"    Affect: Flat dysthymic  Thought processes: illogical and poverty of thought   Thought content:  Denies homicidal ideation  Suicidal Ideation: Denies suicidal ideations, contracts for safety on the unit.   Delusions: Patient presents with delusions.   Perceptual Disturbance:  
  Daily Progress Note  1/20/2024    Patient Name: Car Coyle    CHIEF COMPLAINT: Acute psychosis         SUBJECTIVE:    Car was seen for follow-up assessment today.  He remains compliant with scheduled medications and behaviorally in control.  He has not required any emergency medications in the past 24 hours.  Nursing staff report patient has been pleasant and cooperative and passes time by pacing the unit slowly.  He was standing in the day area on approach.  He was pleasant and agreeable to conversation.  Patient reports that he is \"doing well, I am happy\".  Thoughts and speech were organized and linear.  He continues to deny any perceptual disturbances.  He does admit to some ongoing paranoia, but it has been more manageable.  Patient inquires about transfer to a lower acuity unit.  After discussion with nursing staff and review of documentation patient is appropriate for transfer.  Patient is denying suicidal and homicidal ideation.  Sleep is now fair and he was able to get a few consecutive hours last night.  Patient expresses understanding that with stability of symptoms over the weekend he may be appropriate for discharge on Monday per discussion with attending physician.    Appetite:  [x] Adequate/Unchanged  [] Increased  [] Decreased      Sleep:       [x] Adequate/Unchanged  [] Fair  [] Poor      Group Attendance on Unit:   [x] Yes   [] Selectively    [] No    Compliant with scheduled medications: [x] Yes  [] No    Received emergency medications in past 24 hrs: [] Yes   [x] No    Medication Side Effects: Denies         Mental Status Exam  Level of consciousness: Alert and awake   Appearance: Appropriate attire for setting, standing, with good  grooming and hygiene   Behavior/Motor: Approachable, engages with interviewer, no psychomotor abnormalities   Attitude toward examiner: Cooperative, attentive, good eye contact  Speech: spontaneous, normal rate, and normal volume   Mood: \"Good.\"  Affect: 
  Daily Progress Note  1/21/2024    Patient Name: Car Coyle    CHIEF COMPLAINT: Acute psychosis         SUBJECTIVE:      Patient seen face-to-face for follow-up assessment.  He is cooperative with discussion.  Thought process is more linear today.  He reports he still has some racing thoughts, but they have slowed considerably since adding Abilify 20 mg and taking his Depakote 500 mg twice daily.  He does feel that his medications are beneficial. Feels he can focus better and reports less agitation and restlessness. Patient has been able to maintain behavioral control.  He has not required emergency medications in over 72 hours.  Staff report that he has been attending group programming and is active in treatment.  Affect is appropriate.  Discussed if stable overnight, likelihood for discharge tomorrow.    Appetite:  [x] Adequate/Unchanged  [] Increased  [] Decreased      Sleep:       [x] Adequate/Unchanged  [] Fair  [] Poor      Group Attendance on Unit:   [x] Yes   [] Selectively    [] No    Compliant with scheduled medications: [x] Yes  [] No    Received emergency medications in past 24 hrs: [] Yes   [x] No    Medication Side Effects: Denies         Mental Status Exam  Level of consciousness: Alert and awake   Appearance: Appropriate attire for setting, standing, with good  grooming and hygiene   Behavior/Motor: Approachable, engages with interviewer, no psychomotor abnormalities   Attitude toward examiner: Cooperative, attentive, good eye contact  Speech: spontaneous, normal rate, and normal volume   Mood: \"Good.\"  Affect: Congruent with mood  Thought processes: linear and coherent   Thought content:  Denies homicidal ideation  Suicidal Ideation: Denies suicidal ideations, contracts for safety on the unit.   Delusions: No evidence of delusions.   Perceptual Disturbance: Denies AVH.  Patient does appear to be responding to internal stimuli.   Cognition: Oriented to self, location, time, and 
CLINICAL PHARMACY NOTE: MEDS TO BEDS    Total # of Prescriptions Filled: 3   The following medications were delivered to the patient:  Divalproex ER 500mg  Trazodone 50mg  Ariprazole 20mg    Additional Documentation:  Delivered medications to nurses station  
Pharmacy Medication History Note      List of current medications patient is taking is complete.    Source of information: dispense report, KERI MALHOTRA from 1/6/24    Changes made to medication list:  Medications removed (include reason, ex. therapy complete or physician discontinued, noncompliance):  None     Medications flagged for provider review:  The patient was prescribed divalproex  mg twice daily on discharge. The patient's most recent fill of divalproex was on 12/29/23 and at that time the patient was receiving the divalproex  mg tablets with quantity 60 for 30 days.     Medications added/doses adjusted:  None     Other notes (ex. Recent course of antibiotics, Coumadin dosing):  Patient received Aristada Initio 675 mg and Aristada 1064 mg on 1/3/24 while admitted. He is next due on 3/2/24.   Per [facility], patient filled Vyvanse 50 mg with quantity 30 for 30 days. This was not on the patient's med list at the time of admission on 12/31/23 and was not filled for 6 months prior to this most recent fill. Patient is unable to provide history at this time to clarify if this medication is being taken.       Current Home Medication List at Time of Admission:  Prior to Admission medications    Medication Sig Start Date End Date Taking? Authorizing Provider   divalproex (DEPAKOTE ER) 500 MG extended release tablet Take 1 tablet by mouth 2 times daily (before meals) 1/6/24   Luis A Gonzalez MD   traZODone (DESYREL) 50 MG tablet Take 1 tablet by mouth nightly as needed for Sleep 1/5/24   Luis A Gonzalez MD   hydrOXYzine HCl (ATARAX) 50 MG tablet Take 1 tablet by mouth 3 times daily as needed for Anxiety    Provider, MD Rubin         Please let me know if you have any questions about this encounter. Thank you!    Electronically signed by Zabrina Leahy RPH on 1/15/2024 at 3:52 PM    
RT ASSESSMENT TREATMENT GOALS    [x]Pt Goal:  Pt will identify 1-2 positive coping skills by time of discharge.    []Pt Goal:  Pt will identify 1-2 positive aspects of self by time of discharge.    [x]Pt Goal:  Pt will remain on task/topic for 15-30 minutes during group by time of discharge.    []Pt Goal:  Pt will identify 1-2 aspects of relapse prevention plan by time of discharge.    []Pt Goal:  Pt will join in conversation with peers 1-2 times per group by time of discharge.    []Pt Goal:  Pt will identify 1-2 new leisure interests by time of discharge.    [x]Pt Goal:  Pt will not voice any delusional content by time of discharge.   
  Perceptual Disturbance: Denies AVH.  Patient does appear to be responding to internal stimuli.   Cognition: Oriented to self, location, time, and situation  Memory: intact  Insight: fair   Judgement: fair       Data   height is 1.727 m (5' 7.99\") and weight is 67.6 kg (149 lb). His temperature is 98.3 °F (36.8 °C). His blood pressure is 138/73 and his pulse is 94. His respiration is 14 and oxygen saturation is 99%.   Labs:   Admission on 01/15/2024   Component Date Value Ref Range Status    Amphetamine Screen, Ur 01/16/2024 POSITIVE (A)  NEGATIVE Final    Comment:       (Positive cutoff 1000 ng/mL)                  Barbiturate Screen, Ur 01/16/2024 NEGATIVE  NEGATIVE Final    Comment:       (Positive cutoff 200 ng/mL)                  Benzodiazepine Screen, Urine 01/16/2024 NEGATIVE  NEGATIVE Final    Comment:       (Positive cutoff 200 ng/mL)                  Cocaine Metabolite, Urine 01/16/2024 NEGATIVE  NEGATIVE Final    Comment:       (Positive cutoff 300 ng/mL)                  Methadone Screen, Urine 01/16/2024 NEGATIVE  NEGATIVE Final    Comment:       (Positive cutoff 300 ng/mL)                  Opiates, Urine 01/16/2024 NEGATIVE  NEGATIVE Final    Comment:       (Positive cutoff 300 ng/mL)                  Phencyclidine, Urine 01/16/2024 NEGATIVE  NEGATIVE Final    Comment:       (Positive cutoff 25 ng/mL)                  Cannabinoid Scrn, Ur 01/16/2024 POSITIVE (A)  NEGATIVE Final    Comment:       (Positive cutoff 50 ng/mL)                  Oxycodone Screen, Ur 01/16/2024 NEGATIVE  NEGATIVE Final    Comment:       (Positive cutoff 100 ng/mL)                  Fentanyl, Ur 01/16/2024 NEGATIVE  NEGATIVE Final    Comment:       (Positive cutoff  5 ng/ml)            Test Information 01/16/2024 Assay provides medical screening only.  The absence of expected drug(s) and/or metabolite(s) may indicate diluted or adulterated urine, limitations of testing or timing of collection.   Final    Comment: Testing for 
 The absence of expected drug(s) and/or metabolite(s) may indicate diluted or adulterated urine, limitations of testing or timing of collection.   Final    Comment: Testing for legal purposes should be confirmed by another method.  To request confirmation   of test result, please call the lab within 7 days of sample submission.      WBC 01/17/2024 7.9  4.5 - 13.5 k/uL Final    RBC 01/17/2024 5.34  4.5 - 5.9 m/uL Final    Hemoglobin 01/17/2024 16.1  13.5 - 17.5 g/dL Final    Hematocrit 01/17/2024 47.1  41 - 53 % Final    MCV 01/17/2024 88.2  80 - 100 fL Final    MCH 01/17/2024 30.2  26 - 34 pg Final    MCHC 01/17/2024 34.3  31 - 37 g/dL Final    RDW 01/17/2024 13.2  11.5 - 14.9 % Final    Platelets 01/17/2024 191  150 - 450 k/uL Final    MPV 01/17/2024 8.3  6.0 - 12.0 fL Final    Neutrophils % 01/17/2024 63  34 - 64 % Final    Lymphocytes % 01/17/2024 26  25 - 45 % Final    Monocytes % 01/17/2024 6  2 - 8 % Final    Eosinophils % 01/17/2024 4  0 - 4 % Final    Basophils % 01/17/2024 1  0 - 2 % Final    Neutrophils Absolute 01/17/2024 5.10  1.3 - 9.1 k/uL Final    Lymphocytes Absolute 01/17/2024 2.00  1.2 - 5.2 k/uL Final    Monocytes Absolute 01/17/2024 0.50  0.1 - 1.3 k/uL Final    Eosinophils Absolute 01/17/2024 0.30  0.0 - 0.4 k/uL Final    Basophils Absolute 01/17/2024 0.10  0.0 - 0.2 k/uL Final         Reviewed patient's current plan of care and vital signs with nursing staff.    Labs reviewed: [x] Yes  Last EKG in EMR reviewed: [x] Yes  QTC: 442    Medications  Current Facility-Administered Medications: divalproex (DEPAKOTE ER) extended release tablet 500 mg, 500 mg, Oral, BID AC  ARIPiprazole (ABILIFY) tablet 15 mg, 15 mg, Oral, Daily  LORazepam (ATIVAN) tablet 2 mg, 2 mg, Oral, Once  nicotine (NICODERM CQ) 21 MG/24HR 1 patch, 1 patch, TransDERmal, Daily  acetaminophen (TYLENOL) tablet 650 mg, 650 mg, Oral, Q4H PRN  ibuprofen (ADVIL;MOTRIN) tablet 400 mg, 400 mg, Oral, Q6H PRN  polyethylene glycol (GLYCOLAX) 
equal air entry, clear to ausculation, no wheezing, rales or rhonchi, normal effort  Cardiovascular: normal rate, regular rhythm, no murmur, gallop, rub.  Abdomen: Soft, nontender, nondistended, normal bowel sounds, no hepatomegaly or splenomegaly  Neurologic: There are no new focal motor or sensory deficits, normal muscle tone and bulk, no abnormal sensation, normal speech, cranial nerves II through XII grossly intact  Skin: No gross lesions, rashes, bruising or bleeding on exposed skin area  Extremities:  peripheral pulses palpable, no pedal edema or calf pain with palpation  Psych:     Investigations:      Laboratory Testing:  Recent Results (from the past 24 hour(s))   CBC with Auto Differential    Collection Time: 01/17/24  6:21 AM   Result Value Ref Range    WBC 7.9 4.5 - 13.5 k/uL    RBC 5.34 4.5 - 5.9 m/uL    Hemoglobin 16.1 13.5 - 17.5 g/dL    Hematocrit 47.1 41 - 53 %    MCV 88.2 80 - 100 fL    MCH 30.2 26 - 34 pg    MCHC 34.3 31 - 37 g/dL    RDW 13.2 11.5 - 14.9 %    Platelets 191 150 - 450 k/uL    MPV 8.3 6.0 - 12.0 fL    Neutrophils % 63 34 - 64 %    Lymphocytes % 26 25 - 45 %    Monocytes % 6 2 - 8 %    Eosinophils % 4 0 - 4 %    Basophils % 1 0 - 2 %    Neutrophils Absolute 5.10 1.3 - 9.1 k/uL    Lymphocytes Absolute 2.00 1.2 - 5.2 k/uL    Monocytes Absolute 0.50 0.1 - 1.3 k/uL    Eosinophils Absolute 0.30 0.0 - 0.4 k/uL    Basophils Absolute 0.10 0.0 - 0.2 k/uL       Consultations:   IP CONSULT TO INTERNAL MEDICINE    Assessment :      Primary Problem  Acute psychosis (HCC)    Active Hospital Problems    Diagnosis Date Noted    Acute psychosis (HCC) [F23] 01/15/2024       Plan:     Acute psychosis, to be managed by psychiatry  Intermittently hypertensive and tachycardic likely secondary to underlying anxiety, TSH was checked last admission which was normal, patient is also positive for amphetamines likely contributing to underlying hypertension and tachycardia  Polysubstance abuse, positive for 
PM    Copy sent to Uvaldo Dumont MD    Please note that this chart was generated using voice recognition Dragon dictation software.  Although every effort was made to ensure the accuracy of this automated transcription, some errors in transcription may have occurred.

## 2024-01-22 NOTE — BH NOTE
Patient given tobacco quitline number [phone] at this time, refusing to call at this time and states they are not interested in quitting.  Will consider options in the future.  Patient given information as to the dangers of long term tobacco use. Continue to reinforce the importance of tobacco cessation.

## 2024-01-22 NOTE — PLAN OF CARE
Problem: Behavior  Goal: Pt/Family maintain appropriate behavior and adhere to behavioral management agreement, if implemented  Description: INTERVENTIONS:  1. Assess patient/family's coping skills and  non-compliant behavior (including use of illegal substances)  2. Notify security of behavior or suspected illegal substances which indicate the need for search of the family and/or belongings  3. Encourage verbalization of thoughts and concerns in a socially appropriate manner  4. Utilize positive, consistent limit setting strategies supporting safety of patient, staff and others  5. Encourage participation in the decision making process about the behavioral management agreement  6. If a visitor's behavior poses a threat to safety call refer to organization policy.  7. Initiate consult with , Psychosocial CNS, Spiritual Care as appropriate  1/20/2024 0157 by Mikhail CHAMPAGNE RN  Outcome: Progressing   Patient is isolative to room for long intervals. Patient denies depression and anxiety. Patient remains in behavior control.   
  Problem: Psychosis  Goal: Will report no hallucinations or delusions  Description: INTERVENTIONS:  1. Administer medication as  ordered  2. Assist with reality testing to support increasing orientation  3. Assess if patient's hallucinations or delusions are encouraging self harm or harm to others and intervene as appropriate  1/16/2024 0830 by Westley Sow RN  Outcome: Progressing     Problem: Behavior  Goal: Pt/Family maintain appropriate behavior and adhere to behavioral management agreement, if implemented  Description: INTERVENTIONS:  1. Assess patient/family's coping skills and  non-compliant behavior (including use of illegal substances)  2. Notify security of behavior or suspected illegal substances which indicate the need for search of the family and/or belongings  3. Encourage verbalization of thoughts and concerns in a socially appropriate manner  4. Utilize positive, consistent limit setting strategies supporting safety of patient, staff and others  5. Encourage participation in the decision making process about the behavioral management agreement  6. If a visitor's behavior poses a threat to safety call refer to organization policy.  7. Initiate consult with , Psychosocial CNS, Spiritual Care as appropriate  1/16/2024 0830 by Westley Sow RN  Outcome: Progressing     
  Problem: Psychosis  Goal: Will report no hallucinations or delusions  Description: INTERVENTIONS:  1. Administer medication as  ordered  2. Assist with reality testing to support increasing orientation  3. Assess if patient's hallucinations or delusions are encouraging self harm or harm to others and intervene as appropriate  1/17/2024 0931 by Ana Rosa Nieto RN  Outcome: Progressing  Note: Mr. Coyle denies auditory and visual hallucinations at time of assessment. Patient verbalizes paranoid thoughts related to his brother, stating \"It is my brother fault that I am in here. He keeps messing stuff up for me\" Patient has limited insight.   1/16/2024 2255 by Lourdes Cummings LPN  Outcome: Progressing     Problem: Behavior  Goal: Pt/Family maintain appropriate behavior and adhere to behavioral management agreement, if implemented  Description: INTERVENTIONS:  1. Assess patient/family's coping skills and  non-compliant behavior (including use of illegal substances)  2. Notify security of behavior or suspected illegal substances which indicate the need for search of the family and/or belongings  3. Encourage verbalization of thoughts and concerns in a socially appropriate manner  4. Utilize positive, consistent limit setting strategies supporting safety of patient, staff and others  5. Encourage participation in the decision making process about the behavioral management agreement  6. If a visitor's behavior poses a threat to safety call refer to organization policy.  7. Initiate consult with , Psychosocial CNS, Spiritual Care as appropriate  1/17/2024 0931 by Ana Rosa Nieto RN  Outcome: Progressing  Note: Mr. Coyle is compliant with assessment and medications. Patient eats his meal in the dayroom with peers. Mr. Coyle is able to maintain control of behaviors.   1/16/2024 2255 by Lourdes Cummings LPN  Outcome: Progressing     
  Problem: Psychosis  Goal: Will report no hallucinations or delusions  Description: INTERVENTIONS:  1. Administer medication as  ordered  2. Assist with reality testing to support increasing orientation  3. Assess if patient's hallucinations or delusions are encouraging self harm or harm to others and intervene as appropriate  1/17/2024 2128 by Sari Ramos RN  Outcome: Progressing     Problem: Behavior  Goal: Pt/Family maintain appropriate behavior and adhere to behavioral management agreement, if implemented  Description: INTERVENTIONS:  1. Assess patient/family's coping skills and  non-compliant behavior (including use of illegal substances)  2. Notify security of behavior or suspected illegal substances which indicate the need for search of the family and/or belongings  3. Encourage verbalization of thoughts and concerns in a socially appropriate manner  4. Utilize positive, consistent limit setting strategies supporting safety of patient, staff and others  5. Encourage participation in the decision making process about the behavioral management agreement  6. If a visitor's behavior poses a threat to safety call refer to organization policy.  7. Initiate consult with , Psychosocial CNS, Spiritual Care as appropriate  1/17/2024 2128 by Sari Ramos RN  Outcome: Progressing  Note: Patient refused assessment and vitals. Patient was sleeping for the shift and was not responding / ignoring staff. He was isolative to his room and refused snack      
  Problem: Psychosis  Goal: Will report no hallucinations or delusions  Description: INTERVENTIONS:  1. Administer medication as  ordered  2. Assist with reality testing to support increasing orientation  3. Assess if patient's hallucinations or delusions are encouraging self harm or harm to others and intervene as appropriate  1/18/2024 2203 by Amanda Rousseau  Outcome: Progressing   Patient denies have hallucination during 1:1 talk time. Patient also denies depression and anxiety. Patient have been calm, cooperative and have been isolative to room. Q 15 minutes safety checks maintained.       Problem: Behavior  Goal: Pt/Family maintain appropriate behavior and adhere to behavioral management agreement, if implemented  Description: INTERVENTIONS:  1. Assess patient/family's coping skills and  non-compliant behavior (including use of illegal substances)  2. Notify security of behavior or suspected illegal substances which indicate the need for search of the family and/or belongings  3. Encourage verbalization of thoughts and concerns in a socially appropriate manner  4. Utilize positive, consistent limit setting strategies supporting safety of patient, staff and others  5. Encourage participation in the decision making process about the behavioral management agreement  6. If a visitor's behavior poses a threat to safety call refer to organization policy.  7. Initiate consult with , Psychosocial CNS, Spiritual Care as appropriate  1/18/2024 2203 by Amanda Rousseau  Outcome: Progressing   Patient have maintained appropriate behaviors this evening. Q 15 minutes safety checks maintained.   
  Problem: Psychosis  Goal: Will report no hallucinations or delusions  Description: INTERVENTIONS:  1. Administer medication as  ordered  2. Assist with reality testing to support increasing orientation  3. Assess if patient's hallucinations or delusions are encouraging self harm or harm to others and intervene as appropriate  1/20/2024 0852 by Kimberly Sandoval  Outcome: Progressing  Note: Patient is accepting of 1:1 talk time with staff. Patient is out pacing the hallways. Patient denies suicidal and homicidal ideation and auditory and visual hallucinations and verbally agrees to approach staff if thoughts of self harm arises. Patient is medication compliant. Patient is eating and sleeping well. Patient has some anxiety and paranoia. Reassurance and support provided. Q15 minute checks maintained. Patient remains safe at this time.      
  Problem: Psychosis  Goal: Will report no hallucinations or delusions  Description: INTERVENTIONS:  1. Administer medication as  ordered  2. Assist with reality testing to support increasing orientation  3. Assess if patient's hallucinations or delusions are encouraging self harm or harm to others and intervene as appropriate  1/21/2024 1216 by Helena Morales  Outcome: Progressing     Problem: Behavior  Goal: Pt/Family maintain appropriate behavior and adhere to behavioral management agreement, if implemented  Description: INTERVENTIONS:  1. Assess patient/family's coping skills and  non-compliant behavior (including use of illegal substances)  2. Notify security of behavior or suspected illegal substances which indicate the need for search of the family and/or belongings  3. Encourage verbalization of thoughts and concerns in a socially appropriate manner  4. Utilize positive, consistent limit setting strategies supporting safety of patient, staff and others  5. Encourage participation in the decision making process about the behavioral management agreement  6. If a visitor's behavior poses a threat to safety call refer to organization policy.  7. Initiate consult with , Psychosocial CNS, Spiritual Care as appropriate  1/21/2024 1216 by Helena Morales  Outcome: Progressing   No issues pt is brightened awake alert out social attending groups looking forward to be discharged tomorrow.  
  Problem: Psychosis  Goal: Will report no hallucinations or delusions  Description: INTERVENTIONS:  1. Administer medication as  ordered  2. Assist with reality testing to support increasing orientation  3. Assess if patient's hallucinations or delusions are encouraging self harm or harm to others and intervene as appropriate  Outcome: Progressing   Pt denies both suicidal and homicidal ideations. Pt denies both auditory and visual disturbances. Pt is compliant with scheduled medications and remains in behavioral control. Pt is polite, reports eating and sleeping adequately with safety checks Q15 minutes and at irregular intervals.   Problem: Behavior  Goal: Pt/Family maintain appropriate behavior and adhere to behavioral management agreement, if implemented  Description: INTERVENTIONS:  1. Assess patient/family's coping skills and  non-compliant behavior (including use of illegal substances)  2. Notify security of behavior or suspected illegal substances which indicate the need for search of the family and/or belongings  3. Encourage verbalization of thoughts and concerns in a socially appropriate manner  4. Utilize positive, consistent limit setting strategies supporting safety of patient, staff and others  5. Encourage participation in the decision making process about the behavioral management agreement  6. If a visitor's behavior poses a threat to safety call refer to organization policy.  7. Initiate consult with , Psychosocial CNS, Spiritual Care as appropriate  Outcome: Progressing   Pt denies both suicidal and homicidal ideations. Pt denies both auditory and visual disturbances. Pt is compliant with scheduled medications and remains in behavioral control. Pt is polite, reports eating and sleeping adequately with safety checks Q15 minutes and at irregular intervals.   
  Problem: Psychosis  Goal: Will report no hallucinations or delusions  Description: INTERVENTIONS:  1. Administer medication as  ordered  2. Assist with reality testing to support increasing orientation  3. Assess if patient's hallucinations or delusions are encouraging self harm or harm to others and intervene as appropriate  Outcome: Progressing  Note: Patient denies and states he is tired and wants to sleep this shift. Patient educated and agrees to come to staff if needed this shift. Patient monitored every 15 minutes with environmental safety checks.      Problem: Behavior  Goal: Pt/Family maintain appropriate behavior and adhere to behavioral management agreement, if implemented  Description: INTERVENTIONS:  1. Assess patient/family's coping skills and  non-compliant behavior (including use of illegal substances)  2. Notify security of behavior or suspected illegal substances which indicate the need for search of the family and/or belongings  3. Encourage verbalization of thoughts and concerns in a socially appropriate manner  4. Utilize positive, consistent limit setting strategies supporting safety of patient, staff and others  5. Encourage participation in the decision making process about the behavioral management agreement  6. If a visitor's behavior poses a threat to safety call refer to organization policy.  7. Initiate consult with , Psychosocial CNS, Spiritual Care as appropriate  Outcome: Progressing     
  Problem: Psychosis  Goal: Will report no hallucinations or delusions  Description: INTERVENTIONS:  1. Administer medication as  ordered  2. Assist with reality testing to support increasing orientation  3. Assess if patient's hallucinations or delusions are encouraging self harm or harm to others and intervene as appropriate  Outcome: Progressing  Patient denies experiencing feelings of depression or anxiety, thoughts of wanting to harm themself or others, as well as hallucinations of any kind. Patient is polite and cooperative with staff. Patient is medication compliant and remains behavior controlled. Patient is able to complete activities of daily living without any supervision or assistance. Patient appears mildly brightened. Patient states that they are sleeping and eating sufficiently. Patient is selectively social with their peers when he is in the dayroom. Patient has been attending group therapy sessions and unit programming, and participates in productive and meaningful ways.  Patient is comfortable approaching staff for any needs or requests. Patient agrees to notify staff if any thoughts, feelings, or concerns need to be brought to their attention. Q15 minute safety checks maintained.         Problem: Behavior  Goal: Pt/Family maintain appropriate behavior and adhere to behavioral management agreement, if implemented  Description: INTERVENTIONS:  1. Assess patient/family's coping skills and  non-compliant behavior (including use of illegal substances)  2. Notify security of behavior or suspected illegal substances which indicate the need for search of the family and/or belongings  3. Encourage verbalization of thoughts and concerns in a socially appropriate manner  4. Utilize positive, consistent limit setting strategies supporting safety of patient, staff and others  5. Encourage participation in the decision making process about the behavioral management agreement  6. If a visitor's behavior poses a 
Problem: Behavior  Goal: Pt/Family maintain appropriate behavior and adhere to behavioral management agreement, if implemented  Description: INTERVENTIONS:  1. Assess patient/family's coping skills and  non-compliant behavior (including use of illegal substances)  2. Notify security of behavior or suspected illegal substances which indicate the need for search of the family and/or belongings  3. Encourage verbalization of thoughts and concerns in a socially appropriate manner  4. Utilize positive, consistent limit setting strategies supporting safety of patient, staff and others  5. Encourage participation in the decision making process about the behavioral management agreement  6. If a visitor's behavior poses a threat to safety call refer to organization policy.  7. Initiate consult with , Psychosocial CNS, Spiritual Care as appropriate  1/21/2024 2155 by Mojgan Duarte, RN  Outcome: Progressing    Pt denies thoughts of self-harm, safety checks maintained Q15 minutes. Denies any depression or anxiety. Denies any hallucinations and no delusional thoughts noted. Verbalizes feeling ready to discharge. Paces unit. Cooperative and requests medications appropriately. No complaints about sleep or appetite, hygiene encouraged.        
Problem: Behavior  Goal: Pt/Family maintain appropriate behavior and adhere to behavioral management agreement, if implemented  Description: INTERVENTIONS:  1. Assess patient/family's coping skills and  non-compliant behavior (including use of illegal substances)  2. Notify security of behavior or suspected illegal substances which indicate the need for search of the family and/or belongings  3. Encourage verbalization of thoughts and concerns in a socially appropriate manner  4. Utilize positive, consistent limit setting strategies supporting safety of patient, staff and others  5. Encourage participation in the decision making process about the behavioral management agreement  6. If a visitor's behavior poses a threat to safety call refer to organization policy.  7. Initiate consult with , Psychosocial CNS, Spiritual Care as appropriate  Outcome: Progressing    Pt denies thoughts of self-harm, safety checks maintained Q15 minutes. Anxious, paces unit. Pt denies auditory hallucinations and does not present with any paranoia or delusions. Cooperative and accepting of medications. No complaints about sleep or appetite. Hygiene encouraged.      
Problem: Psychosis  Goal: Will report no hallucinations or delusions  Description: INTERVENTIONS:  1. Administer medication as  ordered  2. Assist with reality testing to support increasing orientation  3. Assess if patient's hallucinations or delusions are encouraging self harm or harm to others and intervene as appropriate  Outcome: Progressing     Patient denies auditory/visual hallucinations. Patient appears to be experiencing delusions, making bizarre comments. Patient stated \"It's disgusting. The government stole from me\". Patient answered most questions throughout assessment with \"I just want out of here\". Patient reports increased anxiety and appears suspicious at times. Patient is compliant with medications. Q15M checks continue per policy and safety maintained.     Problem: Behavior  Goal: Pt/Family maintain appropriate behavior and adhere to behavioral management agreement, if implemented  Description: INTERVENTIONS:  1. Assess patient/family's coping skills and  non-compliant behavior (including use of illegal substances)  2. Notify security of behavior or suspected illegal substances which indicate the need for search of the family and/or belongings  3. Encourage verbalization of thoughts and concerns in a socially appropriate manner  4. Utilize positive, consistent limit setting strategies supporting safety of patient, staff and others  5. Encourage participation in the decision making process about the behavioral management agreement  6. If a visitor's behavior poses a threat to safety call refer to organization policy.  7. Initiate consult with , Psychosocial CNS, Spiritual Care as appropriate  Outcome: Progressing     
signature sheet  Danika Rivera, RN

## 2024-01-29 ENCOUNTER — HOSPITAL ENCOUNTER (INPATIENT)
Age: 19
LOS: 7 days | Discharge: HOME | DRG: 751 | End: 2024-02-05
Attending: EMERGENCY MEDICINE | Admitting: PSYCHIATRY & NEUROLOGY
Payer: COMMERCIAL

## 2024-01-29 DIAGNOSIS — F23 ACUTE PSYCHOSIS (HCC): Primary | ICD-10-CM

## 2024-01-29 LAB
ALBUMIN SERPL-MCNC: 4.8 G/DL (ref 3.5–5.2)
ALP SERPL-CCNC: 66 U/L (ref 40–129)
ALT SERPL-CCNC: 21 U/L (ref 5–41)
AMPHET UR QL SCN: POSITIVE
ANION GAP SERPL CALCULATED.3IONS-SCNC: 12 MMOL/L (ref 9–17)
APAP SERPL-MCNC: <5 UG/ML (ref 10–30)
AST SERPL-CCNC: 19 U/L
BARBITURATES UR QL SCN: NEGATIVE
BASOPHILS # BLD: 0 K/UL (ref 0–0.2)
BASOPHILS NFR BLD: 1 % (ref 0–2)
BENZODIAZ UR QL: NEGATIVE
BILIRUB SERPL-MCNC: 0.5 MG/DL (ref 0.3–1.2)
BUN SERPL-MCNC: 18 MG/DL (ref 6–20)
CALCIUM SERPL-MCNC: 9.9 MG/DL (ref 8.6–10.4)
CANNABINOIDS UR QL SCN: NEGATIVE
CHLORIDE SERPL-SCNC: 102 MMOL/L (ref 98–107)
CO2 SERPL-SCNC: 24 MMOL/L (ref 20–31)
COCAINE UR QL SCN: NEGATIVE
CREAT SERPL-MCNC: 0.9 MG/DL (ref 0.7–1.2)
DATE LAST DOSE: NORMAL
EOSINOPHIL # BLD: 0.2 K/UL (ref 0–0.4)
EOSINOPHILS RELATIVE PERCENT: 3 % (ref 0–4)
ERYTHROCYTE [DISTWIDTH] IN BLOOD BY AUTOMATED COUNT: 12.9 % (ref 11.5–14.9)
ETHANOL PERCENT: <0.01 %
ETHANOLAMINE SERPL-MCNC: <10 MG/DL
FENTANYL UR QL: NEGATIVE
GFR SERPL CREATININE-BSD FRML MDRD: >60 ML/MIN/1.73M2
GLUCOSE SERPL-MCNC: 121 MG/DL (ref 70–99)
HCT VFR BLD AUTO: 49.4 % (ref 41–53)
HGB BLD-MCNC: 16.9 G/DL (ref 13.5–17.5)
LYMPHOCYTES NFR BLD: 2.1 K/UL (ref 1.2–5.2)
LYMPHOCYTES RELATIVE PERCENT: 25 % (ref 25–45)
MAGNESIUM SERPL-MCNC: 1.8 MG/DL (ref 1.6–2.6)
MCH RBC QN AUTO: 30.3 PG (ref 26–34)
MCHC RBC AUTO-ENTMCNC: 34.2 G/DL (ref 31–37)
MCV RBC AUTO: 88.5 FL (ref 80–100)
METHADONE UR QL: NEGATIVE
MONOCYTES NFR BLD: 0.5 K/UL (ref 0.1–1.3)
MONOCYTES NFR BLD: 6 % (ref 2–8)
NEUTROPHILS NFR BLD: 65 % (ref 34–64)
NEUTS SEG NFR BLD: 5.6 K/UL (ref 1.3–9.1)
OPIATES UR QL SCN: NEGATIVE
OXYCODONE UR QL SCN: NEGATIVE
PCP UR QL SCN: NEGATIVE
PLATELET # BLD AUTO: 167 K/UL (ref 150–450)
PMV BLD AUTO: 8.2 FL (ref 6–12)
POTASSIUM SERPL-SCNC: 3.9 MMOL/L (ref 3.7–5.3)
PROT SERPL-MCNC: 6.9 G/DL (ref 6.4–8.3)
RBC # BLD AUTO: 5.58 M/UL (ref 4.5–5.9)
SALICYLATES SERPL-MCNC: <1 MG/DL (ref 3–10)
SODIUM SERPL-SCNC: 138 MMOL/L (ref 135–144)
TEST INFORMATION: ABNORMAL
TME LAST DOSE: NORMAL H
VALPROATE SERPL-MCNC: 65 UG/ML (ref 50–125)
VANCOMYCIN DOSE: NORMAL MG
WBC OTHER # BLD: 8.5 K/UL (ref 4.5–13.5)

## 2024-01-29 PROCEDURE — 99285 EMERGENCY DEPT VISIT HI MDM: CPT

## 2024-01-29 PROCEDURE — 83735 ASSAY OF MAGNESIUM: CPT

## 2024-01-29 PROCEDURE — 96372 THER/PROPH/DIAG INJ SC/IM: CPT

## 2024-01-29 PROCEDURE — 6360000002 HC RX W HCPCS: Performed by: EMERGENCY MEDICINE

## 2024-01-29 PROCEDURE — 80307 DRUG TEST PRSMV CHEM ANLYZR: CPT

## 2024-01-29 PROCEDURE — 80179 DRUG ASSAY SALICYLATE: CPT

## 2024-01-29 PROCEDURE — 85025 COMPLETE CBC W/AUTO DIFF WBC: CPT

## 2024-01-29 PROCEDURE — G0480 DRUG TEST DEF 1-7 CLASSES: HCPCS

## 2024-01-29 PROCEDURE — 80143 DRUG ASSAY ACETAMINOPHEN: CPT

## 2024-01-29 PROCEDURE — 80053 COMPREHEN METABOLIC PANEL: CPT

## 2024-01-29 PROCEDURE — 2040000000 HC PSYCH ICU R&B

## 2024-01-29 PROCEDURE — 36415 COLL VENOUS BLD VENIPUNCTURE: CPT

## 2024-01-29 PROCEDURE — 80164 ASSAY DIPROPYLACETIC ACD TOT: CPT

## 2024-01-29 RX ORDER — HALOPERIDOL 5 MG/1
5 TABLET ORAL EVERY 6 HOURS PRN
Status: DISCONTINUED | OUTPATIENT
Start: 2024-01-29 | End: 2024-02-05 | Stop reason: HOSPADM

## 2024-01-29 RX ORDER — LISDEXAMFETAMINE DIMESYLATE CAPSULES 50 MG/1
50 CAPSULE ORAL EVERY MORNING
Status: ON HOLD | COMMUNITY
End: 2024-02-05 | Stop reason: HOSPADM

## 2024-01-29 RX ORDER — POLYETHYLENE GLYCOL 3350 17 G/17G
17 POWDER, FOR SOLUTION ORAL DAILY PRN
Status: DISCONTINUED | OUTPATIENT
Start: 2024-01-29 | End: 2024-02-05 | Stop reason: HOSPADM

## 2024-01-29 RX ORDER — LANOLIN ALCOHOL/MO/W.PET/CERES
3 CREAM (GRAM) TOPICAL NIGHTLY
Status: DISCONTINUED | OUTPATIENT
Start: 2024-01-29 | End: 2024-02-05 | Stop reason: HOSPADM

## 2024-01-29 RX ORDER — LORAZEPAM 2 MG/ML
2 INJECTION INTRAMUSCULAR ONCE
Status: COMPLETED | OUTPATIENT
Start: 2024-01-29 | End: 2024-01-29

## 2024-01-29 RX ORDER — MAGNESIUM HYDROXIDE/ALUMINUM HYDROXICE/SIMETHICONE 120; 1200; 1200 MG/30ML; MG/30ML; MG/30ML
30 SUSPENSION ORAL EVERY 6 HOURS PRN
Status: DISCONTINUED | OUTPATIENT
Start: 2024-01-29 | End: 2024-02-05 | Stop reason: HOSPADM

## 2024-01-29 RX ORDER — LORAZEPAM 1 MG/1
2 TABLET ORAL EVERY 6 HOURS PRN
Status: DISCONTINUED | OUTPATIENT
Start: 2024-01-29 | End: 2024-02-05 | Stop reason: HOSPADM

## 2024-01-29 RX ORDER — ACETAMINOPHEN 325 MG/1
650 TABLET ORAL EVERY 4 HOURS PRN
Status: DISCONTINUED | OUTPATIENT
Start: 2024-01-29 | End: 2024-02-05 | Stop reason: HOSPADM

## 2024-01-29 RX ORDER — DIPHENHYDRAMINE HYDROCHLORIDE 50 MG/ML
50 INJECTION INTRAMUSCULAR; INTRAVENOUS EVERY 6 HOURS PRN
Status: DISCONTINUED | OUTPATIENT
Start: 2024-01-29 | End: 2024-02-05 | Stop reason: HOSPADM

## 2024-01-29 RX ORDER — HALOPERIDOL 5 MG/ML
5 INJECTION INTRAMUSCULAR EVERY 6 HOURS PRN
Status: DISCONTINUED | OUTPATIENT
Start: 2024-01-29 | End: 2024-02-05 | Stop reason: HOSPADM

## 2024-01-29 RX ORDER — IBUPROFEN 400 MG/1
400 TABLET ORAL EVERY 6 HOURS PRN
Status: DISCONTINUED | OUTPATIENT
Start: 2024-01-29 | End: 2024-02-05 | Stop reason: HOSPADM

## 2024-01-29 RX ORDER — POLYETHYLENE GLYCOL 3350 17 G
2 POWDER IN PACKET (EA) ORAL
Status: DISCONTINUED | OUTPATIENT
Start: 2024-01-29 | End: 2024-02-04

## 2024-01-29 RX ORDER — HYDROXYZINE 50 MG/1
50 TABLET, FILM COATED ORAL 3 TIMES DAILY PRN
Status: DISCONTINUED | OUTPATIENT
Start: 2024-01-29 | End: 2024-02-05 | Stop reason: HOSPADM

## 2024-01-29 RX ORDER — LORAZEPAM 2 MG/ML
2 INJECTION INTRAMUSCULAR EVERY 6 HOURS PRN
Status: DISCONTINUED | OUTPATIENT
Start: 2024-01-29 | End: 2024-02-05 | Stop reason: HOSPADM

## 2024-01-29 RX ADMIN — LORAZEPAM 2 MG: 2 INJECTION INTRAMUSCULAR; INTRAVENOUS at 16:09

## 2024-01-29 ASSESSMENT — PAIN SCALES - GENERAL: PAINLEVEL_OUTOF10: 0

## 2024-01-29 ASSESSMENT — PATIENT HEALTH QUESTIONNAIRE - PHQ9
SUM OF ALL RESPONSES TO PHQ QUESTIONS 1-9: 0
8. MOVING OR SPEAKING SO SLOWLY THAT OTHER PEOPLE COULD HAVE NOTICED. OR THE OPPOSITE, BEING SO FIGETY OR RESTLESS THAT YOU HAVE BEEN MOVING AROUND A LOT MORE THAN USUAL: 0
SUM OF ALL RESPONSES TO PHQ9 QUESTIONS 1 & 2: 0
3. TROUBLE FALLING OR STAYING ASLEEP: 0
10. IF YOU CHECKED OFF ANY PROBLEMS, HOW DIFFICULT HAVE THESE PROBLEMS MADE IT FOR YOU TO DO YOUR WORK, TAKE CARE OF THINGS AT HOME, OR GET ALONG WITH OTHER PEOPLE: 0
1. LITTLE INTEREST OR PLEASURE IN DOING THINGS: 0
7. TROUBLE CONCENTRATING ON THINGS, SUCH AS READING THE NEWSPAPER OR WATCHING TELEVISION: 0
SUM OF ALL RESPONSES TO PHQ QUESTIONS 1-9: 0
4. FEELING TIRED OR HAVING LITTLE ENERGY: 0
5. POOR APPETITE OR OVEREATING: 0
SUM OF ALL RESPONSES TO PHQ QUESTIONS 1-9: 0
9. THOUGHTS THAT YOU WOULD BE BETTER OFF DEAD, OR OF HURTING YOURSELF: 0
SUM OF ALL RESPONSES TO PHQ QUESTIONS 1-9: 0
2. FEELING DOWN, DEPRESSED OR HOPELESS: 0
6. FEELING BAD ABOUT YOURSELF - OR THAT YOU ARE A FAILURE OR HAVE LET YOURSELF OR YOUR FAMILY DOWN: 0

## 2024-01-29 ASSESSMENT — SLEEP AND FATIGUE QUESTIONNAIRES
DO YOU USE A SLEEP AID: YES
AVERAGE NUMBER OF SLEEP HOURS: 8
SLEEP PATTERN: DIFFICULTY FALLING ASLEEP
DO YOU HAVE DIFFICULTY SLEEPING: NO

## 2024-01-29 ASSESSMENT — PAIN - FUNCTIONAL ASSESSMENT
PAIN_FUNCTIONAL_ASSESSMENT: NONE - DENIES PAIN
PAIN_FUNCTIONAL_ASSESSMENT: NONE - DENIES PAIN

## 2024-01-29 ASSESSMENT — LIFESTYLE VARIABLES
HOW MANY STANDARD DRINKS CONTAINING ALCOHOL DO YOU HAVE ON A TYPICAL DAY: PATIENT DOES NOT DRINK
HOW OFTEN DO YOU HAVE A DRINK CONTAINING ALCOHOL: NEVER

## 2024-01-29 NOTE — ED PROVIDER NOTES
[facility] ED  EMERGENCY DEPARTMENT ENCOUNTER      Pt Name: Car Coyle  MRN: [id number]  Birthdate 2005  Date of evaluation: 1/29/24      CHIEF COMPLAINT     Mental Health Evaluation        HISTORY OF PRESENT ILLNESS   HPI 18 y.o. male presents from outpatient mental health treatment  center on an application [facility] (pink slip).      According tot he pink slip completed by the outpatient mental health provider, the patient has been having disorganized and delusion thoughts. Pt also made violent threats against healthcare providers and his family.     Reviewing the medical record, the patient has a h/o polysubstance abuse, oppositional defiant disorder, and mulitple admissions for acute psychosis.  The patient has been admitted to the [facility] 3 previous times since the 16th of December.     According to the patient's medication list from [place] sent with julianne jain, the patient is prescribed depakote, hydroxyzine, trazodone, abilify, vyvanse, and reisperidone.           REVIEW OF SYSTEMS     Review of Systems  Unable to obtain secondary to patient's altered mental status    PAST MEDICAL HISTORY   No past medical history on file.    SURGICAL HISTORY       Past Surgical History:   Procedure Laterality Date    ADENOIDECTOMY      TONSILLECTOMY         CURRENT MEDICATIONS       Previous Medications    ARIPIPRAZOLE (ABILIFY) 20 MG TABLET    Take 1 tablet by mouth daily    DIVALPROEX (DEPAKOTE ER) 500 MG EXTENDED RELEASE TABLET    Take 1 tablet by mouth 2 times daily (before meals)    HYDROXYZINE HCL (ATARAX) 50 MG TABLET    Take 1 tablet by mouth 3 times daily as needed for Anxiety    LISDEXAMFETAMINE (VYVANSE) 50 MG CAPSULE    Take 1 capsule by mouth every morning. Max Daily Amount: 50 mg    TRAZODONE (DESYREL) 50 MG TABLET    Take 1 tablet by mouth nightly as needed for Sleep       ALLERGIES     has No Known Allergies.    FAMILY HISTORY     He indicated that his mother is alive. He

## 2024-01-29 NOTE — ED NOTES
Provisional Diagnosis:   Acute psychosis     Psychosocial and Contextual Factors:   Patient presents at the ED on a pink slip due to increase in delusions and hallucinations.     C-SSRS Summary:  X    Patient: X  Family:    Agency:      Substance Abuse: Patient would not give a straight answer    Present Suicidal Behavior:  Patient denies    Verbal:       Attempt:     Past Suicidal Behavior: Patient denies    Verbal:     Attempt:       Self-Injurious/Self-Mutilation:       Violence Current or Past        Trauma Identified:       Protective Factors:           Risk Factors:           Clinical Summary:    Car Coyle is a 18 y.o. male who presents at the ED on a pink slip due to increase in delusions and hallucinations.     Per pink slip,   \"Client presented to [facility] and was evaluated with disorganized, delusional thoughts. Client did threaten this mother in front of me, and also made threats against a prescriber at [facility]. Client threatened to \"beat Shantell troncoso\" in reference to his mother, and threatened the same to his prescriber, Dr. John Ness. A duty to protect was completed. Client was also hostile towards his grandmother, though not threatening at the time, and he lives with her, giving her concern about her safety should he not be evaluated.\"     Patient stated \"the secret service had brought him in.\" Patient making delusional statements to staff. Patient getting in the trash, doing push ups, spitting, and taking other patients blankets.     Patient not fully able to assess due to delusional thinking. Patient going back and forth on drug use. Patient stating \"my mom is just being a bitch again and sent me here.\"     [facility] reported that patient is not taking his medication but did provide a medication list of what he is suppose to be on.     Patient denies SI/HI.     Patient requested medication. Patient received 2mg IM of ativan.     Level of Care Disposition:    Writer consulted with JOHN Qureshi who

## 2024-01-29 NOTE — ED NOTES
Pt brought to ED from [facility] on pink slip due to having disorganized/delusional thoughts and making threats against a prescriber and his mother. Belongings and patient checked by security. Belongings locked up. Pt in blue gown.

## 2024-01-30 PROCEDURE — 6360000002 HC RX W HCPCS: Performed by: NURSE PRACTITIONER

## 2024-01-30 PROCEDURE — APPSS60 APP SPLIT SHARED TIME 46-60 MINUTES

## 2024-01-30 PROCEDURE — 2040000000 HC PSYCH ICU R&B

## 2024-01-30 PROCEDURE — 99232 SBSQ HOSP IP/OBS MODERATE 35: CPT | Performed by: PSYCHIATRY & NEUROLOGY

## 2024-01-30 PROCEDURE — 99222 1ST HOSP IP/OBS MODERATE 55: CPT | Performed by: INTERNAL MEDICINE

## 2024-01-30 PROCEDURE — 6370000000 HC RX 637 (ALT 250 FOR IP): Performed by: NURSE PRACTITIONER

## 2024-01-30 PROCEDURE — 6370000000 HC RX 637 (ALT 250 FOR IP): Performed by: PSYCHIATRY & NEUROLOGY

## 2024-01-30 RX ORDER — ARIPIPRAZOLE 20 MG/1
20 TABLET ORAL DAILY
Status: DISCONTINUED | OUTPATIENT
Start: 2024-01-30 | End: 2024-02-05 | Stop reason: HOSPADM

## 2024-01-30 RX ORDER — DIVALPROEX SODIUM 500 MG/1
500 TABLET, EXTENDED RELEASE ORAL
Status: DISCONTINUED | OUTPATIENT
Start: 2024-01-30 | End: 2024-02-02

## 2024-01-30 RX ADMIN — HALOPERIDOL 5 MG: 5 TABLET ORAL at 19:24

## 2024-01-30 RX ADMIN — HYDROXYZINE HYDROCHLORIDE 50 MG: 50 TABLET, FILM COATED ORAL at 19:24

## 2024-01-30 RX ADMIN — LORAZEPAM 2 MG: 1 TABLET ORAL at 19:24

## 2024-01-30 RX ADMIN — ARIPIPRAZOLE 20 MG: 20 TABLET ORAL at 19:23

## 2024-01-30 RX ADMIN — HALOPERIDOL LACTATE 5 MG: 5 INJECTION, SOLUTION INTRAMUSCULAR at 07:03

## 2024-01-30 RX ADMIN — LORAZEPAM 2 MG: 2 INJECTION INTRAMUSCULAR; INTRAVENOUS at 07:03

## 2024-01-30 RX ADMIN — DIPHENHYDRAMINE HYDROCHLORIDE 50 MG: 50 INJECTION INTRAMUSCULAR; INTRAVENOUS at 07:03

## 2024-01-30 RX ADMIN — Medication 3 MG: at 19:24

## 2024-01-30 RX ADMIN — DIVALPROEX SODIUM 500 MG: 500 TABLET, EXTENDED RELEASE ORAL at 19:24

## 2024-01-30 NOTE — H&P
Department of Psychiatry  Attending Physician Psychiatric Assessment     Reason for Admission to Psychiatric Unit:  Concerns about patient's safety in the community    CHIEF COMPLAINT:  Acute Psychosis    History obtained from: Patient, electronic medical record          HISTORY OF PRESENT ILLNESS:    Car Coyle is a 18 y.o. male who has a past medical history of mental illness who presents to the ER on pink slip from his outpatient psychiatrist. Patient is acting with erratic and bizarre behaviors that are placing him at risk of harm to himself and others.  He cannot take care of himself outside of the hospital.  He was making homicidal threats towards his mother and staff at [facility].  Per pink slip:     \"Client presented to [facility] and was evaluated with disorganized, delusional thoughts. Client did threaten this mother in front of me, and also made threats against a prescriber at [facility]. Client threatened to \"beat Shantell troncoso\" in reference to his mother, and threatened the same to his prescriber, Dr. John Ness. A duty to protect was completed. Client was also hostile towards his grandmother, though not threatening at the time, and he lives with her, giving her concern about her safety should he not be evaluated.\"     Car is very well known to this writer from multiple previous admissions. This is Car's fourth admission to this facility since December 16, 2023.  He presents with the same symptoms of psychosis, violence towards his family, and paranoia every time.  He was last discharged last week on Abilify and Depakote. He received Aristada Initio and Aristada 1064 mg maintenance dose around the beginning of January.  He has also been prescribed Vyvanse through his outpatient provider which has been stopped multiple times.  Car is a very poor historian and unsure if he has been continuing to take this even though we continue to discontinue this medication when he comes into the hospital.  He has 
commands  General Appearance: Sleepy   Mental status: oriented to person, place, and time with normal affect  Head:  normocephalic, atraumatic.  Eye: no icterus, redness, pupils equal and reactive, extraocular eye movements intact, conjunctiva clear  Ear: normal external ear, no discharge, hearing intact  Nose:  no drainage noted  Mouth: mucous membranes moist  Neck: supple, no carotid bruits, thyroid not palpable  Lungs: Bilateral equal air entry, clear to ausculation, no wheezing, rales or rhonchi, normal effort  Cardiovascular: normal rate, regular rhythm, no murmur, gallop, rub.  Abdomen: Soft, nontender, nondistended, normal bowel sounds, no hepatomegaly or splenomegaly  Neurologic: There are no new focal motor or sensory deficits, normal muscle tone and bulk, no abnormal sensation, normal speech, cranial nerves II through XII grossly intact  Skin: No gross lesions, rashes, bruising or bleeding on exposed skin area  Extremities:  peripheral pulses palpable, no pedal edema or calf pain with palpation  Psych:     Investigations:      Laboratory Testing:  Recent Results (from the past 24 hour(s))   CBC with Auto Differential    Collection Time: 01/29/24  1:30 PM   Result Value Ref Range    WBC 8.5 4.5 - 13.5 k/uL    RBC 5.58 4.5 - 5.9 m/uL    Hemoglobin 16.9 13.5 - 17.5 g/dL    Hematocrit 49.4 41 - 53 %    MCV 88.5 80 - 100 fL    MCH 30.3 26 - 34 pg    MCHC 34.2 31 - 37 g/dL    RDW 12.9 11.5 - 14.9 %    Platelets 167 150 - 450 k/uL    MPV 8.2 6.0 - 12.0 fL    Neutrophils % 65 (H) 34 - 64 %    Lymphocytes % 25 25 - 45 %    Monocytes % 6 2 - 8 %    Eosinophils % 3 0 - 4 %    Basophils % 1 0 - 2 %    Neutrophils Absolute 5.60 1.3 - 9.1 k/uL    Lymphocytes Absolute 2.10 1.2 - 5.2 k/uL    Monocytes Absolute 0.50 0.1 - 1.3 k/uL    Eosinophils Absolute 0.20 0.0 - 0.4 k/uL    Basophils Absolute 0.00 0.0 - 0.2 k/uL   Comprehensive Metabolic Panel    Collection Time: 01/29/24  1:30 PM   Result Value Ref Range    Sodium 138

## 2024-01-30 NOTE — CARE COORDINATION
[facility] Biopsychosocial Assessment    Current Level of Psychosocial Functioning     Independent XX  Dependent    Minimal Assist     Comments:    Psychosocial High Risk Factors (check all that apply)    Unable to obtain meds   Chronic illness/pain    Substance abuse XX  Lack of Family Support   Financial stress   Isolation   Inadequate Community Resources   Suicide attempt(s)  Not taking medications   Victim of crime   Developmental Delay  Unable to manage personal needs    Age 65 or older   Homeless  No transportation   Readmission within 30 days   Unemployment   Traumatic Event     Comments:   Psychiatric Advanced Directives: [facility]- pt refused SW assessment    Family to Involve in Treatment: Lives with grandma per last admission    Sexual Orientation: n/a      Patient Strengths: Lives with grandma, linked with [facility]    Patient Barriers: Hx of admissions, substance abuse      Opiate Education Provided:  [facility]- pt refused SW assessment      CMHC/mental health history: Linked with [facility]    Plan of Care   medication management, group/individual therapies, family meetings, psycho -education, treatment team meetings to assist with stabilization    Initial Discharge Plan:  TBD      Clinical Summary: Pt is a 18 year old male admitted to [facility] for symptoms of psychosis. Pt has a significant hx of prior hospitalizations. Pt was recently discharged from [facility] on 1/22/24. Pt refused to participate in  assessment stating \"I don't want to talk right now, I'm sleepy\". Pt lives with grandma per last assessments. Pt has a hx of substance use. Pt's UDS reflects positive for amphetamines. Pt has a hx of marijuana use. Pt is linked to [facility].  to provide support and assist with discharge planning.

## 2024-01-30 NOTE — GROUP NOTE
Group Therapy Note    Date: 1/30/2024    Group Start Time: 1105  Group End Time: 1145  Group Topic: Music Therapy    [facility]    Music Therapy Group Note        Date: 04/29/24 Start Time: 1105  End Time: 1145      Number of Participants in Group & Unit Census:  2/7    Topic: Patients shared music and identified how relaxing or energetic the song was based on a scale of 1-10 (10 equalling high energy music and 1 equalling relaxing music). Attempted to engage patients in conversation about things in their environment they can control that effects their mood.    Goal of Group:  Goals to increase self-expression; Increase socialization; Increase sense of community; Demonstrate positive use of time;       Comments:     Patient did not participate in Music Therapy group, despite staff encouragement and explanation of benefits.  Patient remain seclusive to self.  Q15 minute safety checks maintained for patient safety and will continue to encourage patient to attend unit programming.

## 2024-01-31 PROCEDURE — 6370000000 HC RX 637 (ALT 250 FOR IP): Performed by: PSYCHIATRY & NEUROLOGY

## 2024-01-31 PROCEDURE — APPSS30 APP SPLIT SHARED TIME 16-30 MINUTES

## 2024-01-31 PROCEDURE — 2040000000 HC PSYCH ICU R&B

## 2024-01-31 PROCEDURE — 99231 SBSQ HOSP IP/OBS SF/LOW 25: CPT | Performed by: INTERNAL MEDICINE

## 2024-01-31 PROCEDURE — 99232 SBSQ HOSP IP/OBS MODERATE 35: CPT | Performed by: PSYCHIATRY & NEUROLOGY

## 2024-01-31 PROCEDURE — 6370000000 HC RX 637 (ALT 250 FOR IP): Performed by: NURSE PRACTITIONER

## 2024-01-31 RX ADMIN — ARIPIPRAZOLE 20 MG: 20 TABLET ORAL at 09:11

## 2024-01-31 RX ADMIN — HYDROXYZINE HYDROCHLORIDE 50 MG: 50 TABLET, FILM COATED ORAL at 20:40

## 2024-01-31 RX ADMIN — ACETAMINOPHEN 650 MG: 325 TABLET, FILM COATED ORAL at 13:41

## 2024-01-31 RX ADMIN — NICOTINE POLACRILEX 2 MG: 2 LOZENGE ORAL at 07:04

## 2024-01-31 RX ADMIN — DIVALPROEX SODIUM 500 MG: 500 TABLET, EXTENDED RELEASE ORAL at 09:11

## 2024-01-31 RX ADMIN — DIVALPROEX SODIUM 500 MG: 500 TABLET, EXTENDED RELEASE ORAL at 17:05

## 2024-01-31 RX ADMIN — Medication 3 MG: at 20:40

## 2024-01-31 RX ADMIN — NICOTINE POLACRILEX 2 MG: 2 LOZENGE ORAL at 10:13

## 2024-01-31 RX ADMIN — HYDROXYZINE HYDROCHLORIDE 50 MG: 50 TABLET, FILM COATED ORAL at 09:11

## 2024-01-31 RX ADMIN — IBUPROFEN 400 MG: 400 TABLET, FILM COATED ORAL at 10:41

## 2024-01-31 RX ADMIN — NICOTINE POLACRILEX 2 MG: 2 LOZENGE ORAL at 17:05

## 2024-01-31 ASSESSMENT — PAIN DESCRIPTION - DESCRIPTORS
DESCRIPTORS: ACHING
DESCRIPTORS: ACHING

## 2024-01-31 ASSESSMENT — PAIN DESCRIPTION - LOCATION
LOCATION: HEAD
LOCATION: HEAD

## 2024-01-31 ASSESSMENT — PAIN - FUNCTIONAL ASSESSMENT: PAIN_FUNCTIONAL_ASSESSMENT: ACTIVITIES ARE NOT PREVENTED

## 2024-01-31 ASSESSMENT — PAIN SCALES - GENERAL
PAINLEVEL_OUTOF10: 6
PAINLEVEL_OUTOF10: 10
PAINLEVEL_OUTOF10: 0

## 2024-01-31 ASSESSMENT — PAIN DESCRIPTION - ORIENTATION: ORIENTATION: MID;ANTERIOR

## 2024-01-31 NOTE — PLAN OF CARE
Problem: Behavior  Goal: Pt/Family maintain appropriate behavior and adhere to behavioral management agreement, if implemented  Description: INTERVENTIONS:  1. Assess patient/family's coping skills and  non-compliant behavior (including use of illegal substances)  2. Notify security of behavior or suspected illegal substances which indicate the need for search of the family and/or belongings  3. Encourage verbalization of thoughts and concerns in a socially appropriate manner  4. Utilize positive, consistent limit setting strategies supporting safety of patient, staff and others  5. Encourage participation in the decision making process about the behavioral management agreement  6. If a visitor's behavior poses a threat to safety call refer to organization policy.  7. Initiate consult with , Psychosocial CNS, Spiritual Care as appropriate  1/30/2024 2035 by Caridad Jensen LPN  Outcome: Not Progressing  Note: Patient is attempting to leave room naked and agitated at family and being here.   Medication was given and seem to be working.   Patient was able to calm down and shower and eat his meal.   Patient was out talking to peer in day room for awhile.   Patient educated to come talk to staff if needing anything.

## 2024-01-31 NOTE — GROUP NOTE
Group Therapy Note    Date: 1/31/2024    Group Start Time: 1430  Group End Time: 1500  Group Topic: Music Therapy    [facility]    Music Therapy Group Note        Date: 1/31/2024 Start Time: 1430  End Time: 1500      Number of Participants in Group & Unit Census:  1/6    Topic: Patients initially offered art group. When all declined offered a variety of individual, 1:1, and other group opportunities    Goal of Group: Goals to increase sense of community; Increase socialization; Demonstrate positive use of time; Increase self-expression;      Comments:     Patient did not participate in Music Therapy group, despite staff encouragement and explanation of benefits.  Patient remain seclusive to self.  Q15 minute safety checks maintained for patient safety and will continue to encourage patient to attend unit programming.

## 2024-01-31 NOTE — GROUP NOTE
Group Therapy Note    Date: 1/31/2024    Group Start Time: 1000  Group End Time: 1030  Group Topic: Psychotherapy    [facility] Ivonne Fleming MSW, LSW        Group Therapy Note    Attendees: 2/6       Patient's Goal:  Expression of feeling    Notes:      Status After Intervention:  Unchanged    Participation Level: Interactive    Participation Quality: Sharing      Speech:  normal      Thought Process/Content: Flight of ideas      Affective Functioning: Incongruent      Mood: euthymic      Level of consciousness:  Preoccupied      Response to Learning: Progressing to goal      Endings: None Reported    Modes of Intervention: Support      Discipline Responsible: /Counselor      Signature:  RAUL Locke, ANDREZ

## 2024-01-31 NOTE — PLAN OF CARE
Problem: Behavior  Goal: Pt/Family maintain appropriate behavior and adhere to behavioral management agreement, if implemented  Description: INTERVENTIONS:  1. Assess patient/family's coping skills and  non-compliant behavior (including use of illegal substances)  2. Notify security of behavior or suspected illegal substances which indicate the need for search of the family and/or belongings  3. Encourage verbalization of thoughts and concerns in a socially appropriate manner  4. Utilize positive, consistent limit setting strategies supporting safety of patient, staff and others  5. Encourage participation in the decision making process about the behavioral management agreement  6. If a visitor's behavior poses a threat to safety call refer to organization policy.  7. Initiate consult with , Psychosocial CNS, Spiritual Care as appropriate  1/31/2024 1002 by Anais Gil RN  Note: Pt is paranoid, guarded, withdrawn, labile. Pt is not cooperative with vitals or assessment. Staff will continue to monitor pt and offer support as needed.      Problem: Psychosis  Goal: Will report no hallucinations or delusions  Description: INTERVENTIONS:  1. Administer medication as  ordered  2. Assist with reality testing to support increasing orientation  3. Assess if patient's hallucinations or delusions are encouraging self harm or harm to others and intervene as appropriate  Note: Pt denies hallucinations and is not observed responding to internal stimuli. Pt has paranoid delusions concerning his mother and loose association is present. Staff will continue to monitor pt and offer support as needed.

## 2024-02-01 PROCEDURE — APPSS30 APP SPLIT SHARED TIME 16-30 MINUTES: Performed by: NURSE PRACTITIONER

## 2024-02-01 PROCEDURE — 99232 SBSQ HOSP IP/OBS MODERATE 35: CPT | Performed by: PSYCHIATRY & NEUROLOGY

## 2024-02-01 PROCEDURE — 6370000000 HC RX 637 (ALT 250 FOR IP): Performed by: NURSE PRACTITIONER

## 2024-02-01 PROCEDURE — 6370000000 HC RX 637 (ALT 250 FOR IP): Performed by: PSYCHIATRY & NEUROLOGY

## 2024-02-01 PROCEDURE — 2040000000 HC PSYCH ICU R&B

## 2024-02-01 RX ADMIN — DIVALPROEX SODIUM 500 MG: 500 TABLET, EXTENDED RELEASE ORAL at 08:54

## 2024-02-01 RX ADMIN — Medication 3 MG: at 20:20

## 2024-02-01 RX ADMIN — NICOTINE POLACRILEX 2 MG: 2 LOZENGE ORAL at 13:51

## 2024-02-01 RX ADMIN — DIVALPROEX SODIUM 500 MG: 500 TABLET, EXTENDED RELEASE ORAL at 15:49

## 2024-02-01 RX ADMIN — HYDROXYZINE HYDROCHLORIDE 50 MG: 50 TABLET, FILM COATED ORAL at 20:20

## 2024-02-01 RX ADMIN — NICOTINE POLACRILEX 2 MG: 2 LOZENGE ORAL at 17:36

## 2024-02-01 RX ADMIN — NICOTINE POLACRILEX 2 MG: 2 LOZENGE ORAL at 15:49

## 2024-02-01 RX ADMIN — ARIPIPRAZOLE 20 MG: 20 TABLET ORAL at 08:54

## 2024-02-01 RX ADMIN — NICOTINE POLACRILEX 2 MG: 2 LOZENGE ORAL at 07:06

## 2024-02-01 RX ADMIN — NICOTINE POLACRILEX 2 MG: 2 LOZENGE ORAL at 20:22

## 2024-02-01 RX ADMIN — NICOTINE POLACRILEX 2 MG: 2 LOZENGE ORAL at 09:22

## 2024-02-01 RX ADMIN — NICOTINE POLACRILEX 2 MG: 2 LOZENGE ORAL at 11:40

## 2024-02-01 NOTE — GROUP NOTE
Group Therapy Note    Date: 2/1/2024    Group Start Time: 1330  Group End Time: 1400  Group Topic: Cognitive Skills    [facility]    Josi Ruiz [facility]    Cognitive Skills Group Note        Date: February 1, 2024 Start Time: 1:30pm  End Time:  200pm      Number of Participants in Group & Unit Census:  0/6    Topic: cognitive skills     Goal of Group: pt will demonstrate improved interpersonal relationships and improve coping skills       Comments:     Patient did not participate in Cognitive Skills group, despite staff encouragement and explanation of benefits.  Patient remain seclusive to self.  Q15 minute safety checks maintained for patient safety and will continue to encourage patient to attend unit programming.              Signature:  THU MARINO

## 2024-02-01 NOTE — GROUP NOTE
Group Therapy Note    Date: 2/1/2024    Group Start Time: 0900  Group End Time: 0915  Group Topic: Community Meeting    Ana Rosa Patel      Group Therapy Note    Attendees: 4/7    Patient's Goal: Patient will verbalize today's goals. Patient will also offer                            supportive listening and interact with peers to clarify and                            understand clearly set goals.         Notes: Patient is making progress AEB participating in group discussion, actively               listening, and supporting other group members.           Status After Intervention: Improved    Participation Level: Active Listener and Interactive    Participation Quality: Appropriate, Attentive, Sharing, and Supportive    Speech: normal    Thought Process/Content: Logical, Linear    Affective Functioning: Congruent    Mood: anxious    Level of consciousness: Oriented x4    Response to Learning: Able to verbalize current knowledge/experience, Able to                                         verbalize/acknowledge new learning, Able to retain                                         information, and Capable of insight     Endings: None Reported    Modes of Intervention: Education, Support, Socialization, and Problem-solving       Discipline Responsible: Behavorial Health Tech    Signature: Ana Rosa Cochran

## 2024-02-01 NOTE — PLAN OF CARE
Problem: Psychosis  Goal: Will report no hallucinations or delusions  Description: INTERVENTIONS:  1. Administer medication as  ordered  2. Assist with reality testing to support increasing orientation  3. Assess if patient's hallucinations or delusions are encouraging self harm or harm to others and intervene as appropriate  1/31/2024 2123 by Fabi Aquino LPN  Outcome: Adequate for Discharge   Patient is suffering from signs of delusions by displaying signs of paranoia. Signs or symptoms of self- harm are not displayed at present time.  Problem: Behavior  Goal: Pt/Family maintain appropriate behavior and adhere to behavioral management agreement, if implemented  Description: INTERVENTIONS:  1. Assess patient/family's coping skills and  non-compliant behavior (including use of illegal substances)  2. Notify security of behavior or suspected illegal substances which indicate the need for search of the family and/or belongings  3. Encourage verbalization of thoughts and concerns in a socially appropriate manner  4. Utilize positive, consistent limit setting strategies supporting safety of patient, staff and others  5. Encourage participation in the decision making process about the behavioral management agreement  6. If a visitor's behavior poses a threat to safety call refer to organization policy.  7. Initiate consult with , Psychosocial CNS, Spiritual Care as appropriate  1/31/2024 2123 by Fabi Aquino LPN  Outcome: Progressing   Patient not displaying signs or symptoms of inappropriate behavior at this present time.

## 2024-02-01 NOTE — PLAN OF CARE
Problem: Psychosis  Goal: Will report no hallucinations or delusions  Description: INTERVENTIONS:  1. Administer medication as  ordered  2. Assist with reality testing to support increasing orientation  3. Assess if patient's hallucinations or delusions are encouraging self harm or harm to others and intervene as appropriate  2/1/2024 0938 by Tiffanie Russo RN  Outcome: Progressing     Problem: Behavior  Goal: Pt/Family maintain appropriate behavior and adhere to behavioral management agreement, if implemented  Description: INTERVENTIONS:  1. Assess patient/family's coping skills and  non-compliant behavior (including use of illegal substances)  2. Notify security of behavior or suspected illegal substances which indicate the need for search of the family and/or belongings  3. Encourage verbalization of thoughts and concerns in a socially appropriate manner  4. Utilize positive, consistent limit setting strategies supporting safety of patient, staff and others  5. Encourage participation in the decision making process about the behavioral management agreement  6. If a visitor's behavior poses a threat to safety call refer to organization policy.  7. Initiate consult with , Psychosocial CNS, Spiritual Care as appropriate  2/1/2024 0938 by Tiffanie Russo RN  Outcome: Progressing       Patient is seen in the day room alert. Upon approach patient is preoccupied and withdrawn. Patient has been medication compliant and denies any side effects. Patient is focused on discharge and being moved \"off this unit\". Patient denies depression and anxiety. Patient denies homicidal or suicidal ideation. Denying any olfactory, visual or tactile disturbances. Patient remains isolative to self and his room. Patient appearance is disheveled and was encouraged to shower. Patient was encouraged to attend groups and socialize with peers. Patient has maintained behavioral control thus far. Patient maintains Q 15 minute

## 2024-02-02 PROCEDURE — 6370000000 HC RX 637 (ALT 250 FOR IP): Performed by: PSYCHIATRY & NEUROLOGY

## 2024-02-02 PROCEDURE — 99232 SBSQ HOSP IP/OBS MODERATE 35: CPT | Performed by: PSYCHIATRY & NEUROLOGY

## 2024-02-02 PROCEDURE — 2040000000 HC PSYCH ICU R&B

## 2024-02-02 PROCEDURE — APPSS30 APP SPLIT SHARED TIME 16-30 MINUTES: Performed by: NURSE PRACTITIONER

## 2024-02-02 PROCEDURE — 6370000000 HC RX 637 (ALT 250 FOR IP): Performed by: NURSE PRACTITIONER

## 2024-02-02 RX ADMIN — NICOTINE POLACRILEX 2 MG: 2 LOZENGE ORAL at 10:45

## 2024-02-02 RX ADMIN — Medication 3 MG: at 22:44

## 2024-02-02 RX ADMIN — HYDROXYZINE HYDROCHLORIDE 50 MG: 50 TABLET, FILM COATED ORAL at 17:48

## 2024-02-02 RX ADMIN — NICOTINE POLACRILEX 2 MG: 2 LOZENGE ORAL at 09:09

## 2024-02-02 RX ADMIN — NICOTINE POLACRILEX 2 MG: 2 LOZENGE ORAL at 12:48

## 2024-02-02 RX ADMIN — NICOTINE POLACRILEX 2 MG: 2 LOZENGE ORAL at 17:42

## 2024-02-02 RX ADMIN — ARIPIPRAZOLE 20 MG: 20 TABLET ORAL at 08:42

## 2024-02-02 RX ADMIN — DIVALPROEX SODIUM 500 MG: 500 TABLET, EXTENDED RELEASE ORAL at 08:41

## 2024-02-02 RX ADMIN — NICOTINE POLACRILEX 2 MG: 2 LOZENGE ORAL at 14:49

## 2024-02-02 ASSESSMENT — PAIN SCALES - GENERAL: PAINLEVEL_OUTOF10: 0

## 2024-02-02 NOTE — GROUP NOTE
Group Therapy Note    Date: 2/2/2024    Group Start Time: 1100  Group End Time: 1215  Group Topic: Recreational    [facility]        Group Therapy Note    Attendees: 3/6       Patient's Goal:  Patients all expressed different interests for group time. Hosted an open recreation style group, offering a variety of group and individual leisure opportunities including music, arts, games, and talk time. Goals to increase self-expression; Demonstrate positive use of time; Increase sense of community; Increase rapport with staff;    Notes:  Patient initially requested for his writer to teach him and peer to play cheMarketo. Patient left shortly after starting. Patinet pacing in hallway. Periodically in day room, and requesting some songs. Offered other activities and patient declined.     Status After Intervention:  Unchanged    Participation Level: Minimal    Participation Quality: Resistant      Speech:  normal      Thought Process/Content: Logical  Linear      Affective Functioning: Constricted/Restricted      Mood: depressed      Level of consciousness:  Alert      Response to Learning: Resistant      Endings: None Reported    Modes of Intervention: Support, Socialization, Exploration, Activity, Media, and Reality-testing      Discipline Responsible: Psychoeducational Specialist      Signature:  Mike Simons

## 2024-02-02 NOTE — PLAN OF CARE
Problem: Psychosis  Goal: Will report no hallucinations or delusions  Description: INTERVENTIONS:  1. Administer medication as  ordered  2. Assist with reality testing to support increasing orientation  3. Assess if patient's hallucinations or delusions are encouraging self harm or harm to others and intervene as appropriate  2/2/2024 1459 by Helena Morales  Outcome: Progressing     Problem: Behavior  Goal: Pt/Family maintain appropriate behavior and adhere to behavioral management agreement, if implemented  Description: INTERVENTIONS:  1. Assess patient/family's coping skills and  non-compliant behavior (including use of illegal substances)  2. Notify security of behavior or suspected illegal substances which indicate the need for search of the family and/or belongings  3. Encourage verbalization of thoughts and concerns in a socially appropriate manner  4. Utilize positive, consistent limit setting strategies supporting safety of patient, staff and others  5. Encourage participation in the decision making process about the behavioral management agreement  6. If a visitor's behavior poses a threat to safety call refer to organization policy.  7. Initiate consult with , Psychosocial CNS, Spiritual Care as appropriate  2/2/2024 1459 by Helena Morales  Outcome: Progressing   Pt currently denies any thoughts to harm self or others denies any hallucinations reports normal sleep and appetite cooperative with treatment.

## 2024-02-02 NOTE — PLAN OF CARE
Problem: Psychosis  Goal: Will report no hallucinations or delusions  Description: INTERVENTIONS:  1. Administer medication as  ordered  2. Assist with reality testing to support increasing orientation  3. Assess if patient's hallucinations or delusions are encouraging self harm or harm to others and intervene as appropriate  Outcome: Progressing    Patient denies any audio and visual hallucinations.     Problem: Behavior  Goal: Pt/Family maintain appropriate behavior and adhere to behavioral management agreement, if implemented  Description: INTERVENTIONS:  1. Assess patient/family's coping skills and  non-compliant behavior (including use of illegal substances)  2. Notify security of behavior or suspected illegal substances which indicate the need for search of the family and/or belongings  3. Encourage verbalization of thoughts and concerns in a socially appropriate manner  4. Utilize positive, consistent limit setting strategies supporting safety of patient, staff and others  5. Encourage participation in the decision making process about the behavioral management agreement  6. If a visitor's behavior poses a threat to safety call refer to organization policy.  7. Initiate consult with , Psychosocial CNS, Spiritual Care as appropriate  Outcome: Progressing    Patient was able to maintain appropriate behavior while inpatient.

## 2024-02-03 PROCEDURE — 6370000000 HC RX 637 (ALT 250 FOR IP): Performed by: PSYCHIATRY & NEUROLOGY

## 2024-02-03 PROCEDURE — 6370000000 HC RX 637 (ALT 250 FOR IP): Performed by: NURSE PRACTITIONER

## 2024-02-03 PROCEDURE — 1240000000 HC EMOTIONAL WELLNESS R&B

## 2024-02-03 PROCEDURE — APPSS30 APP SPLIT SHARED TIME 16-30 MINUTES: Performed by: NURSE PRACTITIONER

## 2024-02-03 PROCEDURE — 99232 SBSQ HOSP IP/OBS MODERATE 35: CPT | Performed by: PSYCHIATRY & NEUROLOGY

## 2024-02-03 RX ADMIN — NICOTINE POLACRILEX 2 MG: 2 LOZENGE ORAL at 10:29

## 2024-02-03 RX ADMIN — ARIPIPRAZOLE 20 MG: 20 TABLET ORAL at 08:23

## 2024-02-03 RX ADMIN — NICOTINE POLACRILEX 2 MG: 2 LOZENGE ORAL at 08:33

## 2024-02-03 RX ADMIN — NICOTINE POLACRILEX 2 MG: 2 LOZENGE ORAL at 20:45

## 2024-02-03 RX ADMIN — NICOTINE POLACRILEX 2 MG: 2 LOZENGE ORAL at 12:24

## 2024-02-03 RX ADMIN — HYDROXYZINE HYDROCHLORIDE 50 MG: 50 TABLET, FILM COATED ORAL at 16:03

## 2024-02-03 RX ADMIN — DIVALPROEX SODIUM 750 MG: 500 TABLET, EXTENDED RELEASE ORAL at 16:03

## 2024-02-03 RX ADMIN — NICOTINE POLACRILEX 2 MG: 2 LOZENGE ORAL at 15:08

## 2024-02-03 RX ADMIN — DIVALPROEX SODIUM 750 MG: 500 TABLET, EXTENDED RELEASE ORAL at 08:23

## 2024-02-03 NOTE — PLAN OF CARE
Problem: Psychosis  Goal: Will report no hallucinations or delusions  Description: INTERVENTIONS:  1. Administer medication as  ordered  2. Assist with reality testing to support increasing orientation  3. Assess if patient's hallucinations or delusions are encouraging self harm or harm to others and intervene as appropriate  2/2/2024 2307 by Danie Boo, RN  Outcome: Progressing  Patient denies any audio or visual hallucinations.      Problem: Behavior  Goal: Pt/Family maintain appropriate behavior and adhere to behavioral management agreement, if implemented  Description: INTERVENTIONS:  1. Assess patient/family's coping skills and  non-compliant behavior (including use of illegal substances)  2. Notify security of behavior or suspected illegal substances which indicate the need for search of the family and/or belongings  3. Encourage verbalization of thoughts and concerns in a socially appropriate manner  4. Utilize positive, consistent limit setting strategies supporting safety of patient, staff and others  5. Encourage participation in the decision making process about the behavioral management agreement  6. If a visitor's behavior poses a threat to safety call refer to organization policy.  7. Initiate consult with , Psychosocial CNS, Spiritual Care as appropriate  2/2/2024 2307 by Danie Boo, RN  Outcome: Progressing    Patient was able to maintain appropriate behavior while on the unit.

## 2024-02-04 PROCEDURE — APPSS30 APP SPLIT SHARED TIME 16-30 MINUTES: Performed by: NURSE PRACTITIONER

## 2024-02-04 PROCEDURE — 6370000000 HC RX 637 (ALT 250 FOR IP): Performed by: NURSE PRACTITIONER

## 2024-02-04 PROCEDURE — 99232 SBSQ HOSP IP/OBS MODERATE 35: CPT | Performed by: PSYCHIATRY & NEUROLOGY

## 2024-02-04 PROCEDURE — 6370000000 HC RX 637 (ALT 250 FOR IP): Performed by: PSYCHIATRY & NEUROLOGY

## 2024-02-04 PROCEDURE — 1240000000 HC EMOTIONAL WELLNESS R&B

## 2024-02-04 RX ORDER — POLYETHYLENE GLYCOL 3350 17 G
2 POWDER IN PACKET (EA) ORAL
Status: DISCONTINUED | OUTPATIENT
Start: 2024-02-04 | End: 2024-02-05 | Stop reason: HOSPADM

## 2024-02-04 RX ADMIN — ARIPIPRAZOLE 20 MG: 20 TABLET ORAL at 07:44

## 2024-02-04 RX ADMIN — NICOTINE POLACRILEX 2 MG: 2 LOZENGE ORAL at 13:39

## 2024-02-04 RX ADMIN — HYDROXYZINE HYDROCHLORIDE 50 MG: 50 TABLET, FILM COATED ORAL at 07:44

## 2024-02-04 RX ADMIN — HYDROXYZINE HYDROCHLORIDE 50 MG: 50 TABLET, FILM COATED ORAL at 16:41

## 2024-02-04 RX ADMIN — HYDROXYZINE HYDROCHLORIDE 50 MG: 50 TABLET, FILM COATED ORAL at 21:38

## 2024-02-04 RX ADMIN — NICOTINE POLACRILEX 2 MG: 2 LOZENGE ORAL at 09:44

## 2024-02-04 RX ADMIN — DIVALPROEX SODIUM 750 MG: 500 TABLET, EXTENDED RELEASE ORAL at 07:44

## 2024-02-04 RX ADMIN — NICOTINE POLACRILEX 2 MG: 2 LOZENGE ORAL at 07:44

## 2024-02-04 RX ADMIN — Medication 3 MG: at 21:38

## 2024-02-04 RX ADMIN — ACETAMINOPHEN 650 MG: 325 TABLET, FILM COATED ORAL at 16:41

## 2024-02-04 RX ADMIN — DIVALPROEX SODIUM 750 MG: 500 TABLET, EXTENDED RELEASE ORAL at 16:39

## 2024-02-04 RX ADMIN — NICOTINE POLACRILEX 2 MG: 2 LOZENGE ORAL at 12:09

## 2024-02-04 ASSESSMENT — PAIN DESCRIPTION - ORIENTATION: ORIENTATION: MID;LEFT

## 2024-02-04 ASSESSMENT — PAIN - FUNCTIONAL ASSESSMENT: PAIN_FUNCTIONAL_ASSESSMENT: ACTIVITIES ARE NOT PREVENTED

## 2024-02-04 ASSESSMENT — PAIN SCALES - GENERAL
PAINLEVEL_OUTOF10: 0
PAINLEVEL_OUTOF10: 5

## 2024-02-04 ASSESSMENT — PAIN DESCRIPTION - LOCATION: LOCATION: EAR;HEAD

## 2024-02-04 ASSESSMENT — PAIN SCALES - WONG BAKER: WONGBAKER_NUMERICALRESPONSE: 0

## 2024-02-04 ASSESSMENT — PAIN DESCRIPTION - DESCRIPTORS: DESCRIPTORS: ACHING

## 2024-02-04 NOTE — GROUP NOTE
Group Therapy Note    Date: 2/4/2024    Group Start Time: 0900  Group End Time: 0930  Group Topic: Community Meeting    Kimberly Bowling        Group Therapy Note    Attendees: 6/17       Patient's Goal:  Discharge planning     Status After Intervention:  Improved    Participation Level: Active Listener and Interactive    Participation Quality: Appropriate and Attentive      Speech:  normal      Thought Process/Content: Logical      Affective Functioning: Exaggerated      Mood: euthymic      Level of consciousness:  Alert and Oriented x4      Response to Learning: Able to verbalize current knowledge/experience and Able to verbalize/acknowledge new learning      Endings: None Reported    Modes of Intervention: Education and Support      Discipline Responsible: Behavorial Health Tech      Signature:  Kimberly Sandoval

## 2024-02-04 NOTE — GROUP NOTE
Group Therapy Note    Date: 2/4/2024    Group Start Time: 1330  Group End Time: 1415  Group Topic: Psychoeducation    Ivonne Lezama MSW, ANDREZ        Group Therapy Note    Attendees: 6/18       Patient was offered group therapy today but declined to participate despite encouragement from staff.  1:1 was offered.       Signature:  RAUL Locke LSW

## 2024-02-04 NOTE — PLAN OF CARE
Problem: Psychosis  Goal: Will report no hallucinations or delusions  Description: INTERVENTIONS:  1. Administer medication as  ordered  2. Assist with reality testing to support increasing orientation  3. Assess if patient's hallucinations or delusions are encouraging self harm or harm to others and intervene as appropriate  Outcome: Progressing     Problem: Behavior  Goal: Pt/Family maintain appropriate behavior and adhere to behavioral management agreement, if implemented  Description: INTERVENTIONS:  1. Assess patient/family's coping skills and  non-compliant behavior (including use of illegal substances)  2. Notify security of behavior or suspected illegal substances which indicate the need for search of the family and/or belongings  3. Encourage verbalization of thoughts and concerns in a socially appropriate manner  4. Utilize positive, consistent limit setting strategies supporting safety of patient, staff and others  5. Encourage participation in the decision making process about the behavioral management agreement  6. If a visitor's behavior poses a threat to safety call refer to organization policy.  7. Initiate consult with , Psychosocial CNS, Spiritual Care as appropriate  Outcome: Progressing     Problem: Pain  Goal: Verbalizes/displays adequate comfort level or baseline comfort level  Outcome: Progressing     Note: Patient denies suicidal or homicidal ideations. Denies depression and anxiety. Social with peers in dayroom, attended groups. Med compliant. Patient educated on alerting staff if trying to harm self or others. Safety check every 15 min.
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
## 2024-02-04 NOTE — CARE COORDINATION
Pt approached SW regarding follow up at the time of discharge. Pt requests services to be transferred from [facility] to [facility].

## 2024-02-04 NOTE — PLAN OF CARE
Problem: Psychosis  Goal: Will report no hallucinations or delusions  Description: INTERVENTIONS:  1. Administer medication as  ordered  2. Assist with reality testing to support increasing orientation  3. Assess if patient's hallucinations or delusions are encouraging self harm or harm to others and intervene as appropriate  2/4/2024 0847 by Ese Redmond RN  Outcome: Progressing     Problem: Behavior  Goal: Pt/Family maintain appropriate behavior and adhere to behavioral management agreement, if implemented  Description: INTERVENTIONS:  1. Assess patient/family's coping skills and  non-compliant behavior (including use of illegal substances)  2. Notify security of behavior or suspected illegal substances which indicate the need for search of the family and/or belongings  3. Encourage verbalization of thoughts and concerns in a socially appropriate manner  4. Utilize positive, consistent limit setting strategies supporting safety of patient, staff and others  5. Encourage participation in the decision making process about the behavioral management agreement  6. If a visitor's behavior poses a threat to safety call refer to organization policy.  7. Initiate consult with , Psychosocial CNS, Spiritual Care as appropriate  2/4/2024 0847 by Ese Redmond RN  Outcome: Progressing     Problem: Pain  Goal: Verbalizes/displays adequate comfort level or baseline comfort level  2/4/2024 0847 by Ese Redmond RN  Outcome: Progressing     Patient denies having hallucinations and does not present as delusional at this time. Patient has exhibited appropriate behavior while on unit thus far. Q15 minute safety checks maintained. Patient remains safe at this time.

## 2024-02-05 VITALS
DIASTOLIC BLOOD PRESSURE: 82 MMHG | RESPIRATION RATE: 16 BRPM | HEART RATE: 95 BPM | HEIGHT: 67 IN | WEIGHT: 149 LBS | OXYGEN SATURATION: 99 % | BODY MASS INDEX: 23.39 KG/M2 | TEMPERATURE: 97.1 F | SYSTOLIC BLOOD PRESSURE: 133 MMHG

## 2024-02-05 PROCEDURE — 6370000000 HC RX 637 (ALT 250 FOR IP): Performed by: PSYCHIATRY & NEUROLOGY

## 2024-02-05 PROCEDURE — 99239 HOSP IP/OBS DSCHRG MGMT >30: CPT | Performed by: PSYCHIATRY & NEUROLOGY

## 2024-02-05 RX ORDER — DIVALPROEX SODIUM 250 MG/1
750 TABLET, EXTENDED RELEASE ORAL
Qty: 30 TABLET | Refills: 3 | Status: SHIPPED | OUTPATIENT
Start: 2024-02-05

## 2024-02-05 RX ORDER — HYDROXYZINE 50 MG/1
50 TABLET, FILM COATED ORAL 3 TIMES DAILY PRN
Qty: 20 TABLET | Refills: 0 | Status: SHIPPED | OUTPATIENT
Start: 2024-02-05 | End: 2024-02-15

## 2024-02-05 RX ORDER — ARIPIPRAZOLE 20 MG/1
20 TABLET ORAL DAILY
Qty: 30 TABLET | Refills: 0 | Status: SHIPPED | OUTPATIENT
Start: 2024-02-05

## 2024-02-05 RX ADMIN — ARIPIPRAZOLE 20 MG: 20 TABLET ORAL at 07:52

## 2024-02-05 RX ADMIN — NICOTINE POLACRILEX 2 MG: 2 LOZENGE ORAL at 10:12

## 2024-02-05 RX ADMIN — NICOTINE POLACRILEX 2 MG: 2 LOZENGE ORAL at 07:11

## 2024-02-05 RX ADMIN — DIVALPROEX SODIUM 750 MG: 500 TABLET, EXTENDED RELEASE ORAL at 07:52

## 2024-02-05 NOTE — DISCHARGE INSTRUCTIONS
Hotline ([facility] Crisis Care Line)  [phone]      Recovery Help line- [phone]      To obtain results of pending studies call Medical Records at: [phone]     For emergencies and 24 hour/7 days a week contact information:  [phone]

## 2024-02-05 NOTE — GROUP NOTE
Group Therapy Note    Date: 2/5/2024    Group Start Time: 0905  Group End Time: 0920  Group Topic: Group Therapy    Renetta Mckenzie LPN        Group Therapy Note    Attendees: 9/20         Status After Intervention:  Improved    Participation Level: Active Listener and Interactive    Participation Quality: Appropriate, Attentive, and Sharing      Speech:  normal      Thought Process/Content: Logical  Linear      Affective Functioning: Congruent      Mood: anxious      Level of consciousness:  Alert, Oriented x4, and Attentive      Response to Learning: Able to verbalize current knowledge/experience, Able to retain information, and Capable of insight      Endings: None Reported    Modes of Intervention: Education, Support, and Socialization      Discipline Responsible: Licensed Practical Nurse      Signature:  Renetta Singletary LPN

## 2024-02-05 NOTE — CARE COORDINATION
Name: Car Coyle    : 2005    Auth number: [id number]     Discharge Date: 2024    Destination: home    *If you have any specific discharge questions, please contact the assigned /discharge planner: Ramesh [phone]      Discharge Medications:      Medication List        CHANGE how you take these medications      divalproex 250 MG extended release tablet  Commonly known as: DEPAKOTE ER  Take 3 tablets by mouth 2 times daily (before meals)  What changed:   medication strength  how much to take  Notes to patient: Mood stabilizer/seizures            CONTINUE taking these medications      ARIPiprazole 20 MG tablet  Commonly known as: ABILIFY  Take 1 tablet by mouth daily  Notes to patient: Mood stabilizer      hydrOXYzine HCl 50 MG tablet  Commonly known as: ATARAX  Take 1 tablet by mouth 3 times daily as needed for Anxiety  Notes to patient: Anxiety            STOP taking these medications      traZODone 50 MG tablet  Commonly known as: DESYREL     Vyvanse 50 MG capsule  Generic drug: lisdexamfetamine               Where to Get Your Medications        These medications were sent to [id number] - [address] -  [phone] - F [phone]  [address] [place] [address]      Phone: [phone]   ARIPiprazole 20 MG tablet  divalproex 250 MG extended release tablet  hydrOXYzine HCl 50 MG tablet         Follow Up Appointment: [facility]   [place] [place] [address]  [phone]  Follow up on 2024  appointment at 2:30 pm

## 2024-02-05 NOTE — DISCHARGE SUMMARY
Where to Get Your Medications        These medications were sent to [id number] - [place], [place] - [address] - P [phone] - F [phone]  [address] [place] [id number]      Phone: [phone]   ARIPiprazole 20 MG tablet  divalproex 250 MG extended release tablet  hydrOXYzine HCl 50 MG tablet               Core Measures statement:   Not applicable      TIME SPENT - 35 MINUTES TO COMPLETE THE EVALUATION, DISCHARGE SUMMARY, MEDICATION RECONCILIATION AND FOLLOW UP CARE                                         Car Coyle is a 18 y.o. male being evaluated FACE TO FACE    --KAROLINE REID MD on 2/5/2024 at 10:33 AM    An electronic signature was used to authenticate this note.     **This report has been created using voice recognition software. It may contain minor errors which are inherent in voice recognition technology.**

## 2024-02-05 NOTE — PLAN OF CARE
Problem: Psychosis  Goal: Will report no hallucinations or delusions  Description: INTERVENTIONS:  1. Administer medication as  ordered  2. Assist with reality testing to support increasing orientation  3. Assess if patient's hallucinations or delusions are encouraging self harm or harm to others and intervene as appropriate  2/4/2024 2242 by Gail Rutledge LPN  Outcome: Progressing     Problem: Pain  Goal: Verbalizes/displays adequate comfort level or baseline comfort level  2/4/2024 2242 by Gail Rutledge LPN  Outcome: Progressing   Pt denies thoughts of self harm. Safety checks q 15mins.  Pt has generalized anxiety. Pt denies auditory hallucinations. Pt is isolative to room, but compliant with care and medications. No complaints about sleep or appetite. Hygiene encouraged.

## 2024-02-05 NOTE — TRANSITION OF CARE
Patient refused      Patient discharged to: Home; discussed with patient/caregiver
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
## 2024-02-05 NOTE — PROGRESS NOTES
Behavioral Services  Medicare Certification Upon Admission    I certify that this patient's inpatient psychiatric hospital admission is medically necessary for:    [x] (1) Treatment which could reasonably be expected to improve this patient's condition,       [x] (2) Or for diagnostic study;     AND     [x](2) The inpatient psychiatric services are provided while the individual is under the care of a physician and are included in the individualized plan of care.    Estimated length of stay/service 2-9 days    Plan for post-hospital care -outpatient care    Electronically signed by KAROLINE REID MD on 1/30/2024 at 9:01 AM      
  Daily Progress Note  2/1/2024    Patient Name: Car Coyle    CHIEF COMPLAINT:  Acute Psychosis          SUBJECTIVE:    Car was seen for follow-up assessment today.  He has been compliant with scheduled medications.  He has not required any emergency medications in the past 24 hours.  Nursing staff report patient has been focused on discharge and wanting to hayder his mom today.  He continues to present with delusions.  He was resting in bed on approach.  He was difficult to engage in sustained, spontaneous conversation.  He continues to have poor insight into his mental illness and does not believe that he needs to be at this facility.  He is currently denying suicidal and homicidal ideation.  He is denying auditory and visual hallucinations.  He continues to be focused on his mom and overall he believes she played in his admission.  At this time patient has yet to demonstrate stability and warrants further hospitalization for safety and stabilization.    Appetite:  [] Normal/Adequate/Unchanged  [] Increased  [x] Decreased      Sleep:       [] Normal/Adequate/Unchanged  [x] Fair  [] Poor      Group Attendance on Unit:   [] Yes  [x] Selectively    [] No    Medication Side Effects: Denies         Mental Status Exam  Level of consciousness: Alert and awake.   Appearance: Appropriate attire for setting, resting in bed, with poor grooming and hygiene, disheveled.   Behavior/Motor: Evasive, withdrawn  Attitude toward examiner: Semi-cooperative, poorly engaged, poor eye contact  Speech: Mumbled speech, irritable tone  Mood: Constricted  Affect: Blunted  Thought processes: Disorganized, illogical in his delusions  Thought content: Denies homicidal ideation.  Suicidal Ideation: Denies suicidal ideations  Delusions: Paranoid, persecutory and grandiose delusions present  Perceptual Disturbance: Patient does not appear to be responding to internal stimuli. Denies auditory hallucinations. Denies visual hallucinations. 
  Daily Progress Note  2/2/2024    Patient Name: Car Coyle    CHIEF COMPLAINT:  Acute Psychosis          SUBJECTIVE:    Car was seen for follow-up assessment today.  He has been compliant with scheduled medications.  He has not required any emergency medications in the past 48 hours.  Nursing staff report patient continues to be focused on discharge and suing his mother.  He has been isolative to his room for much of the day today.  He was resting in bed but awake.  He continues to be difficult to engage in sustained conversation and was guarded and irritable.  He is very focused on wanting to leave.  He expresses understanding that he is to most likely be here until Sunday and although he is not happy about this he is trying to excepted.  He denied auditory and visual hallucinations.  He is denying suicidal and homicidal ideation.  He continues to have low insight into his mental illness.  Although modestly improving patient has yet to demonstrate sustained stability and warrants further hospitalization for safety and stabilization.    Appetite:  [] Normal/Adequate/Unchanged  [] Increased  [x] Decreased      Sleep:       [] Normal/Adequate/Unchanged  [x] Fair  [] Poor      Group Attendance on Unit:   [] Yes  [x] Selectively    [] No    Medication Side Effects: Denies         Mental Status Exam  Level of consciousness: Alert and awake.   Appearance: Appropriate attire for setting, resting in bed, with poor grooming and hygiene, disheveled.   Behavior/Motor: Evasive, withdrawn  Attitude toward examiner: Semi-cooperative, poorly engaged, poor eye contact  Speech: Mumbled speech, irritable tone  Mood: Constricted  Affect: Blunted  Thought processes: Disorganized, illogical in his delusions  Thought content: Denies homicidal ideation.  Suicidal Ideation: Denies suicidal ideations  Delusions: Paranoid, persecutory and grandiose delusions present  Perceptual Disturbance: Patient does not appear to be 
CLINICAL PHARMACY NOTE: MEDS TO BEDS    Total # of Prescriptions Filled: 2   The following medications were delivered to the patient:  Divalproex [id number]  Hydroxyzine HLC 50mg      Additional Documentation: delivered to SHARDA Salazar 2/5/24 12:04pm -Abilify too soon to fill on file at [facility] [phone]  
IN-PATIENT SERVICE  [facility] Internal Medicine    CONSULTATION / HISTORY AND PHYSICAL EXAMINATION            Date:   1/31/2024  Patient name:  Car Coyle  Date of admission:  1/29/2024  3:11 PM  MRN:   [id number]  Account:  [id number]  YOB: 2005  PCP:    Uvaldo Shaw MD  Room:   [address] [place] [address]  Code Status:    Full Code    Physician Requesting Consult: Luis A Gonzalez MD    Reason for Consult:  medical management    Chief Complaint:     Chief Complaint   Patient presents with    Mental Health Problem       History Obtained From:     Patient medical record nursing staff    History of Present Illness:   Patient, has past medical history of oppositional defiance disorder, polysubstance abuse, admitted to [facility] with acute psychosis  History is extremely limited, patient is going through withdrawal, very sleepy, getting Ativan  He has multiple hospitals in the past with similar condition  UDS positive for amphetamine  Never diagnosed with chronic medical  Diabetes, hypertension coronary artery disease    Past Medical History:     History reviewed. No pertinent past medical history.     Past Surgical History:     Past Surgical History:   Procedure Laterality Date    ADENOIDECTOMY      TONSILLECTOMY          Medications Prior to Admission:     Prior to Admission medications    Medication Sig Start Date End Date Taking? Authorizing Provider   lisdexamfetamine (VYVANSE) 50 MG capsule Take 1 capsule by mouth every morning. Max Daily Amount: 50 mg   Yes Provider, MD Rubin   divalproex (DEPAKOTE ER) 500 MG extended release tablet Take 1 tablet by mouth 2 times daily (before meals) 1/22/24   Liam Hannon MD   traZODone (DESYREL) 50 MG tablet Take 1 tablet by mouth nightly as needed for Sleep 1/22/24   Liam Hannon MD   ARIPiprazole (ABILIFY) 20 MG tablet Take 1 tablet by mouth daily 1/22/24   Liam Hannon MD   hydrOXYzine HCl (ATARAX) 50 MG 
Pharmacy Medication History Note      List of current medications patient is taking is complete.     Source of information: dispense report, KERI MALHOTRA from 1/22/24    Changes made to medication list:  Medications flagged for removal (include reason, ex. noncompliance):  None     Medications removed (include reason, ex. therapy complete or physician discontinued):  None     Medications added/doses adjusted:  Vyvanse 50 mg daily     Other notes (ex. Recent course of antibiotics, Coumadin dosing):  Per [facility], last fill of Vyvanse was on 12/29/23 with quantity 30 for 30 days.   On 1/22/24, the patient received divalproex ER, trazodone, and aripiprazole via Meds to [facility] program.   Patient received Aristada Initio 675 mg and Aristada 1064 mg on 1/3/24 during admission.   The patient reportedly received a single dose of Saphris 5 mg at 1435 at [facility].     Denies use of other OTC or herbal medications.      Allergies clarified    Medication list provided to the patient: no  Medication education provided to the patient: none    Prior to Admission Medications   Prescriptions Last Dose Informant Patient Reported? Taking?   ARIPiprazole (ABILIFY) 20 MG tablet   No No   Sig: Take 1 tablet by mouth daily   divalproex (DEPAKOTE ER) 500 MG extended release tablet   No No   Sig: Take 1 tablet by mouth 2 times daily (before meals)   hydrOXYzine HCl (ATARAX) 50 MG tablet   Yes No   Sig: Take 1 tablet by mouth 3 times daily as needed for Anxiety   lisdexamfetamine (VYVANSE) 50 MG capsule   Yes Yes   Sig: Take 1 capsule by mouth every morning. Max Daily Amount: 50 mg   traZODone (DESYREL) 50 MG tablet   No No   Sig: Take 1 tablet by mouth nightly as needed for Sleep      Facility-Administered Medications Last Administration Doses Remaining   ARIPiprazole lauroxil (ARISTADA) injection 1,064 mg None recorded 1              Electronically signed by Zabrina Leahy RPH on 1/29/2024 at 6:18 PM                               
RT ASSESSMENT TREATMENT GOALS    [x]Pt Goal:  Pt will identify 1-2 positive coping skills by time of discharge.    []Pt Goal:  Pt will identify 1-2 positive aspects of self by time of discharge.    []Pt Goal:  Pt will remain on task/topic for 15-30 minutes during group by time of discharge.    [x]Pt Goal:  Pt will identify 1-2 aspects of relapse prevention plan by time of discharge.    []Pt Goal:  Pt will join in conversation with peers 1-2 times per group by time of discharge.    []Pt Goal:  Pt will identify 1-2 new leisure interests by time of discharge.    [x]Pt Goal:  Pt will not voice any delusional content by time of discharge.   
01/29/2024 POSITIVE (A)  NEGATIVE Final    Comment:       (Positive cutoff 1000 ng/mL)                  Barbiturate Screen, Ur 01/29/2024 NEGATIVE  NEGATIVE Final    Comment:       (Positive cutoff 200 ng/mL)                  Benzodiazepine Screen, Urine 01/29/2024 NEGATIVE  NEGATIVE Final    Comment:       (Positive cutoff 200 ng/mL)                  Cocaine Metabolite, Urine 01/29/2024 NEGATIVE  NEGATIVE Final    Comment:       (Positive cutoff 300 ng/mL)                  Methadone Screen, Urine 01/29/2024 NEGATIVE  NEGATIVE Final    Comment:       (Positive cutoff 300 ng/mL)                  Opiates, Urine 01/29/2024 NEGATIVE  NEGATIVE Final    Comment:       (Positive cutoff 300 ng/mL)                  Phencyclidine, Urine 01/29/2024 NEGATIVE  NEGATIVE Final    Comment:       (Positive cutoff 25 ng/mL)                  Cannabinoid Scrn, Ur 01/29/2024 NEGATIVE  NEGATIVE Final    Comment:       (Positive cutoff 50 ng/mL)                  Oxycodone Screen, Ur 01/29/2024 NEGATIVE  NEGATIVE Final    Comment:       (Positive cutoff 100 ng/mL)                  Fentanyl, Ur 01/29/2024 NEGATIVE  NEGATIVE Final    Comment:       (Positive cutoff  5 ng/ml)            Test Information 01/29/2024 Assay provides medical screening only.  The absence of expected drug(s) and/or metabolite(s) may indicate diluted or adulterated urine, limitations of testing or timing of collection.   Final    Comment: Testing for legal purposes should be confirmed by another method.  To request confirmation   of test result, please call the lab within 7 days of sample submission.      Acetaminophen Level 01/29/2024 <5 (L)  10 - 30 ug/mL Final    Salicylate Lvl 01/29/2024 <1.0 (L)  3 - 10 mg/dL Final    Valproic Acid Lvl 01/29/2024 65  50 - 125 ug/mL Final    Valproic Dose amount 01/29/2024 NOT GIVEN   Final    Valproic Date last dose 01/29/2024 NOT GIVEN   Final    Valproic Time last dose 01/29/2024 NOT GIVEN   Final         Reviewed patient's 
**AND** LORazepam (ATIVAN) tablet 2 mg, 2 mg, Oral, Q6H PRN  nicotine polacrilex (COMMIT) lozenge 2 mg, 2 mg, Oral, Q2H PRN    ASSESSMENT  Acute psychosis (HCC)         HANDOFF  Patient symptoms are: Modestly Improving.  Patient transferred to a lower acuity unit  Medications as determined by attending physician  Monitor need and frequency of PRN medications.  Encourage participation in groups and milieu.  Probable discharge is to be determined by MD.     Electronically signed by NICOLAS Gonzalez CNP on 2/3/2024 at 2:27 PM    **This report has been created using voice recognition software. It may contain minor errors which are inherent in voice recognition technology.**  I independently saw and evaluated the patient.  I reviewed the  documentation above    .  Any additional comments or changes to the   documentation are stated below otherwise agree with assessment.      The patient has been taking his medications.  He has found it somewhat beneficial.  He is not smiling as much today.  The patient will be moved to the regular floor.    No Medication Changes Today    PLAN  Medications as noted above  Attempt to develop insight  Psycho-education conducted.  Supportive Therapy conducted.  Follow-up daily while on inpatient unit    Electronically signed by KAROLINE REID MD on 2/3/24 at 5:20 PM EST      
mg, 50 mg, IntraMUSCular, Q6H PRN  haloperidol (HALDOL) tablet 5 mg, 5 mg, Oral, Q6H PRN **AND** LORazepam (ATIVAN) tablet 2 mg, 2 mg, Oral, Q6H PRN    ASSESSMENT  Acute psychosis (HCC)         HANDOFF  Patient symptoms are: Modestly Improving.  Medications as determined by attending physician  Monitor need and frequency of PRN medications.  Encourage participation in groups and milieu.  Probable discharge is to be determined by MD.     Electronically signed by NICOLAS Gonzalez CNP on 2/4/2024 at 2:36 PM    **This report has been created using voice recognition software. It may contain minor errors which are inherent in voice recognition technology.**  I independently saw and evaluated the patient.  I reviewed the  documentation above    .  Any additional comments or changes to the   documentation are stated below otherwise agree with assessment.      The patient was seen face-to-face.  His mood is improved.  He is maintaining behavioral control.  He is discharged focused.  He is compliant with medications and reports no side effects.  His expected length of stay is 1 day.    No Medication Changes Today    PLAN  Medications as noted above  Attempt to develop insight  Psycho-education conducted.  Supportive Therapy conducted.  Follow-up daily while on inpatient unit    Electronically signed by KAROLINE REID MD on 2/4/24 at 7:28 PM EST

## 2024-02-05 NOTE — GROUP NOTE
Group Therapy Note    Date: 2/5/2024    Group Start Time: 1100  Group End Time: 1140  Group Topic: Psychoeducation    [facility]    [facility]    Group Therapy Note    Attendees: 10/20     Patient's Goal:  Patient will identify benefits of experience during admission, identify constructive feedback of experience during admission, and demonstrate improved interpersonal skills    Notes:  Patient attended group and participated    Status After Intervention:  Unchanged    Participation Level: Interactive    Participation Quality: Appropriate, Sharing, and Intrusive      Speech:  pressured      Thought Process/Content: Flight of ideas      Affective Functioning: Constricted/Restricted      Mood: elevated      Level of consciousness:  Alert and Preoccupied      Response to Learning: Able to verbalize current knowledge/experience and Able to verbalize/acknowledge new learning      Endings: None Reported    Modes of Intervention: Education, Support, Socialization, and Exploration      Discipline Responsible: Psychoeducational Specialist      Signature:  THU Lindsey

## 2024-02-05 NOTE — DISCHARGE INSTR - DIET

## 2024-02-05 NOTE — CARE COORDINATION
At the request of pt social work provided pt with return to work note:         2024    To whom it may concern;    Please be advised that Car Coyle   2005 was admitted on 24 and discharged on 2024.  If you have any questions, please refer to patient due to HIPPA law we cannot release any other requested information.     Sincerely,  [place] [phone] [address].  [place] [phone]

## 2024-02-08 ENCOUNTER — HOSPITAL ENCOUNTER (EMERGENCY)
Age: 19
Discharge: HOME | End: 2024-02-08
Attending: EMERGENCY MEDICINE
Payer: MEDICAID

## 2024-02-08 VITALS
SYSTOLIC BLOOD PRESSURE: 129 MMHG | BODY MASS INDEX: 21.93 KG/M2 | RESPIRATION RATE: 16 BRPM | TEMPERATURE: 98.6 F | HEART RATE: 110 BPM | DIASTOLIC BLOOD PRESSURE: 68 MMHG | OXYGEN SATURATION: 100 % | WEIGHT: 140 LBS

## 2024-02-08 DIAGNOSIS — F22 DELUSION (HCC): ICD-10-CM

## 2024-02-08 DIAGNOSIS — F23 ACUTE PSYCHOSIS (HCC): Primary | ICD-10-CM

## 2024-02-08 DIAGNOSIS — F22 PARANOIA (HCC): ICD-10-CM

## 2024-02-08 LAB
ALBUMIN SERPL-MCNC: 4.9 G/DL (ref 3.5–5.2)
ALP SERPL-CCNC: 90 U/L (ref 40–129)
ALT SERPL-CCNC: 24 U/L (ref 5–41)
AMPHET UR QL SCN: POSITIVE
ANION GAP SERPL CALCULATED.3IONS-SCNC: 14 MMOL/L (ref 9–17)
AST SERPL-CCNC: 24 U/L
BARBITURATES UR QL SCN: NEGATIVE
BASOPHILS # BLD: 0.1 K/UL (ref 0–0.2)
BASOPHILS NFR BLD: 1 % (ref 0–2)
BENZODIAZ UR QL: NEGATIVE
BILIRUB SERPL-MCNC: 0.7 MG/DL (ref 0.3–1.2)
BUN SERPL-MCNC: 30 MG/DL (ref 6–20)
CALCIUM SERPL-MCNC: 10.1 MG/DL (ref 8.6–10.4)
CANNABINOIDS UR QL SCN: POSITIVE
CHLORIDE SERPL-SCNC: 100 MMOL/L (ref 98–107)
CO2 SERPL-SCNC: 24 MMOL/L (ref 20–31)
COCAINE UR QL SCN: NEGATIVE
CREAT SERPL-MCNC: 1 MG/DL (ref 0.7–1.2)
EOSINOPHIL # BLD: 0.2 K/UL (ref 0–0.4)
EOSINOPHILS RELATIVE PERCENT: 2 % (ref 0–4)
ERYTHROCYTE [DISTWIDTH] IN BLOOD BY AUTOMATED COUNT: 13.9 % (ref 11.5–14.9)
ETHANOL PERCENT: <0.01 %
ETHANOLAMINE SERPL-MCNC: <10 MG/DL
FENTANYL UR QL: NEGATIVE
GFR SERPL CREATININE-BSD FRML MDRD: >60 ML/MIN/1.73M2
GLUCOSE SERPL-MCNC: 117 MG/DL (ref 70–99)
HCT VFR BLD AUTO: 51.7 % (ref 41–53)
HGB BLD-MCNC: 17.5 G/DL (ref 13.5–17.5)
LYMPHOCYTES NFR BLD: 1.8 K/UL (ref 1.2–5.2)
LYMPHOCYTES RELATIVE PERCENT: 20 % (ref 25–45)
MCH RBC QN AUTO: 30 PG (ref 26–34)
MCHC RBC AUTO-ENTMCNC: 33.8 G/DL (ref 31–37)
MCV RBC AUTO: 88.7 FL (ref 80–100)
METHADONE UR QL: NEGATIVE
MONOCYTES NFR BLD: 0.8 K/UL (ref 0.1–1.3)
MONOCYTES NFR BLD: 9 % (ref 2–8)
NEUTROPHILS NFR BLD: 68 % (ref 34–64)
NEUTS SEG NFR BLD: 6.1 K/UL (ref 1.3–9.1)
OPIATES UR QL SCN: NEGATIVE
OXYCODONE UR QL SCN: NEGATIVE
PCP UR QL SCN: NEGATIVE
PLATELET # BLD AUTO: 173 K/UL (ref 150–450)
PMV BLD AUTO: 8.6 FL (ref 6–12)
POTASSIUM SERPL-SCNC: 3.8 MMOL/L (ref 3.7–5.3)
PROT SERPL-MCNC: 7.6 G/DL (ref 6.4–8.3)
RBC # BLD AUTO: 5.83 M/UL (ref 4.5–5.9)
SODIUM SERPL-SCNC: 138 MMOL/L (ref 135–144)
TEST INFORMATION: ABNORMAL
WBC OTHER # BLD: 9 K/UL (ref 4.5–13.5)

## 2024-02-08 PROCEDURE — 80307 DRUG TEST PRSMV CHEM ANLYZR: CPT

## 2024-02-08 PROCEDURE — 80053 COMPREHEN METABOLIC PANEL: CPT

## 2024-02-08 PROCEDURE — 85025 COMPLETE CBC W/AUTO DIFF WBC: CPT

## 2024-02-08 PROCEDURE — 99283 EMERGENCY DEPT VISIT LOW MDM: CPT

## 2024-02-08 PROCEDURE — 36415 COLL VENOUS BLD VENIPUNCTURE: CPT

## 2024-02-08 PROCEDURE — G0480 DRUG TEST DEF 1-7 CLASSES: HCPCS

## 2024-02-08 ASSESSMENT — PAIN DESCRIPTION - LOCATION: LOCATION: ABDOMEN

## 2024-02-08 ASSESSMENT — ENCOUNTER SYMPTOMS
ABDOMINAL PAIN: 0
SHORTNESS OF BREATH: 0
COUGH: 0
VOMITING: 0
NAUSEA: 0

## 2024-02-08 ASSESSMENT — PAIN DESCRIPTION - PAIN TYPE: TYPE: ACUTE PAIN

## 2024-02-08 ASSESSMENT — PAIN DESCRIPTION - DESCRIPTORS: DESCRIPTORS: STABBING;ACHING

## 2024-02-08 ASSESSMENT — PAIN SCALES - GENERAL: PAINLEVEL_OUTOF10: 1

## 2024-02-08 ASSESSMENT — PAIN - FUNCTIONAL ASSESSMENT: PAIN_FUNCTIONAL_ASSESSMENT: 0-10

## 2024-02-08 ASSESSMENT — PAIN DESCRIPTION - ORIENTATION: ORIENTATION: RIGHT

## 2024-02-08 NOTE — ED PROVIDER NOTES
EMERGENCY DEPARTMENT ENCOUNTER    Pt Name: Car Coyle  MRN: [id number]  Birthdate 2005  Date of evaluation: 2/8/24  CHIEF COMPLAINT       Chief Complaint   Patient presents with    Manic Behavior    Mental Health Problem     HISTORY OF PRESENT ILLNESS   18-year-old male with past medical history of depression, psychosis is presenting for mental health evaluation.  He was sent here by [employer].  Patient said that he just wants to get away from his family.  He says that they are mentally abusive to him.  He is denying any suicidal or homicidal ideations.  He said that his father has tried to kick him out of the house and he does not want to be around him at this time.   Grandfather brought him in and he told us that the patient was threatening to rip his parents head off.  He said that he was going to chop their heads off.  He was also seen by social work here and he was telling them that the [employer] be was out to get him.  He said that he has a list of KGB members that he is going to find and kill.    The history is provided by the patient and a relative.           REVIEW OF SYSTEMS     Review of Systems   Constitutional:  Negative for chills and fever.   HENT:  Negative for congestion.    Eyes:  Negative for visual disturbance.   Respiratory:  Negative for cough and shortness of breath.    Cardiovascular:  Negative for chest pain.   Gastrointestinal:  Negative for abdominal pain, nausea and vomiting.   Genitourinary:  Negative for flank pain.   Musculoskeletal:  Negative for myalgias.   Neurological:  Negative for dizziness, light-headedness and headaches.   Psychiatric/Behavioral:  Negative for agitation, dysphoric mood, hallucinations, self-injury, sleep disturbance and suicidal ideas.      PASTMEDICAL HISTORY   History reviewed. No pertinent past medical history.  Past Problem List  Patient Active Problem List   Diagnosis Code    Psychosis ([facility]) F29    Depression with suicidal ideation F32.A, R45.851

## 2024-02-08 NOTE — ED TRIAGE NOTES
Pt states he has rt flank pain but appears manic with paranoid thoughts. Pt states his dad is sneaking him his medications for mental illness.

## 2024-02-09 NOTE — ED NOTES
Dr Chatman in to speak with patient for face to face consult.  Reiterated to patient the need to cut out drug use.  Patient agreed to allow family to help with removal of all stimulant medications.  Patient provided writer verbal consent to speak with grandfather.  
Patient pacing around the PPU for the last two hours, talking and rambling to himself.   
Safeguard in [facility] for patient watch. Safeguard informed that they need to stay with the patient at all time, must be present in the room and if they need a break or relief to let the nurse know so they can be replaced. Safeguard verbalizes understanding. Belongings and patient checked by security. Belongings locked up. Pt in blue gown.   
Writer phoned patients grandfather with update on discharge plan.  
while on a walk threatened to kill both of his parents then rip their heads off of their body. Vinay states patient has been restless and staing he works in the [employer], then goes on rants that he is a millionaire who makes millions of dollars every hour. Patients grandfather states patient later had a \"list\" of people he was going to kill which included most of his family other than his grandfather at the time. Per grandfather, when patient signed initially tried to sign in the the ED today he gave the wrong name and date of birth. Patients grandfather is not sure where patient has been staying, and believes he has been staying at a drug house. Per Grandfather, Dr. Ness was attempting to get patient to [facility] inpatient psych today, but was told they was a two day wait.    Patient reportedly has unstable housing as most of his family members have kicked him out.  When talking to writer, patient states he is upset because his father took his vape  Patient has somewhat disorganized speech. When writer asked patient about the reported comments of killing his parents today patient jumped to a different topic stating a group of people threatened to kill him a little while ago and states \"I am afraid I am going to pull the trigger on them. But I won't.\"  Patient has difficulty focusing. When writer asked patient about the supposed list of people he wanted to kill patient began rambling about the \"[employer]\". Patient states \"I was threatened by the [employer] a couple of days ago, they sent a picture of a glock to me. They want to kill me. They want to kill all of the people on the list. Everybody in the [place] wants peace with [place].\"    Patient states he has been up for days because he is afraid his father will try to steal all of his belongings.    Writer saw patient in the ED prior to his admission on 12/16/23. Patient has similar delusional content to that admission, but is in more behavorial control, and calm nature. Patient

## 2024-02-09 NOTE — CONSULTS
psychiatric medications includes:   Risperdal, Prozac, Zoloft, Depakote, Abilify, Aristada, Vyvanse     Vyvanse last filled 12/29/2023 for 30 day supply by NP at [facility]  Received Aristada Initio and 1064 mg injection 1/3/2024     Medication Compliance: He states yes but unknown if this is true.      Adverse reactions from psychotropic medications: [] Yes [x] No            Lifetime Psychiatric Review of Systems         Depression: Denies     Anxiety: Denies     Panic Attacks: Denies     Jacquie or Hypomania: Exhibits     Phobias: Denies     Obsessions and Compulsions: Denies     Visual Hallucinations: Denies     Auditory Hallucinations: Denies     Delusions: Exhibits     Paranoia: Exhibits     PTSD: Denies       Prior to Admission medications    Medication Sig Start Date End Date Taking? Authorizing Provider   ARIPiprazole (ABILIFY) 20 MG tablet Take 1 tablet by mouth daily 2/5/24   Luis A Gonzalez MD   divalproex (DEPAKOTE ER) 250 MG extended release tablet Take 3 tablets by mouth 2 times daily (before meals) 2/5/24   Luis A Gonzalez MD   hydrOXYzine HCl (ATARAX) 50 MG tablet Take 1 tablet by mouth 3 times daily as needed for Anxiety 2/5/24 2/15/24  Luis A Gonzalez MD        Medications:    Current Facility-Administered Medications: [START ON 3/2/2024] ARIPiprazole lauroxil (ARISTADA) injection 1,064 mg, 1,064 mg, IntraMUSCular, Q2 Months     Past Medical History:    History reviewed. No pertinent past medical history.    Past Surgical History:        Procedure Laterality Date    ADENOIDECTOMY      TONSILLECTOMY         Allergies: Patient has no known allergies.      Social History:      Born in: [place], [place]  Family: Raised by his grandfather however has relationship with his parents. Very paranoid of family  Highest Level of Education: HS Graduate  Occupation: Unemployed  Marital Status: Single  Children: No children  Residence: Unsure if he is welcome to return to his uncles house or his grandfathers.    Stressors: